# Patient Record
Sex: FEMALE | Race: BLACK OR AFRICAN AMERICAN | URBAN - METROPOLITAN AREA
[De-identification: names, ages, dates, MRNs, and addresses within clinical notes are randomized per-mention and may not be internally consistent; named-entity substitution may affect disease eponyms.]

---

## 2018-06-14 ENCOUNTER — OFFICE VISIT (OUTPATIENT)
Dept: INTERNAL MEDICINE CLINIC | Age: 38
End: 2018-06-14

## 2018-06-14 VITALS
BODY MASS INDEX: 37.05 KG/M2 | OXYGEN SATURATION: 97 % | HEIGHT: 64 IN | TEMPERATURE: 99.8 F | DIASTOLIC BLOOD PRESSURE: 81 MMHG | HEART RATE: 89 BPM | WEIGHT: 217 LBS | SYSTOLIC BLOOD PRESSURE: 120 MMHG | RESPIRATION RATE: 18 BRPM

## 2018-06-14 DIAGNOSIS — M21.372 LEFT FOOT DROP: ICD-10-CM

## 2018-06-14 DIAGNOSIS — E11.8 TYPE 2 DIABETES MELLITUS WITH COMPLICATION, WITHOUT LONG-TERM CURRENT USE OF INSULIN (HCC): Primary | ICD-10-CM

## 2018-06-14 DIAGNOSIS — R20.0 NUMBNESS: ICD-10-CM

## 2018-06-14 LAB — HBA1C MFR BLD HPLC: 12.3 %

## 2018-06-14 RX ORDER — METFORMIN HYDROCHLORIDE 500 MG/1
500 TABLET, EXTENDED RELEASE ORAL
Qty: 30 TAB | Refills: 2 | Status: SHIPPED | OUTPATIENT
Start: 2018-06-14 | End: 2018-06-14 | Stop reason: SDUPTHER

## 2018-06-14 RX ORDER — METFORMIN HYDROCHLORIDE 500 MG/1
500 TABLET, EXTENDED RELEASE ORAL
Qty: 30 TAB | Refills: 2 | Status: SHIPPED | OUTPATIENT
Start: 2018-06-14 | End: 2019-01-16 | Stop reason: ALTCHOICE

## 2018-06-14 NOTE — PROGRESS NOTES
Chief Complaint   Patient presents with    Numbness     C/O numbness to top of left foot for 2 weeks. 1. Have you been to the ER, urgent care clinic since your last visit? Hospitalized since your last visit? No    2. Have you seen or consulted any other health care providers outside of the [facility] since your last visit? Include any pap smears or colon screening.  Yes When: GYN  PHQ over the last two weeks 6/14/2018   Little interest or pleasure in doing things Not at all   Feeling down, depressed or hopeless Not at all   Total Score PHQ 2 0

## 2018-06-18 DIAGNOSIS — M21.372 LEFT FOOT DROP: ICD-10-CM

## 2018-06-18 DIAGNOSIS — R20.0 NUMBNESS: ICD-10-CM

## 2018-06-20 NOTE — PATIENT INSTRUCTIONS
Learning About Foot Drop  What is foot drop? Foot drop is a problem that makes you unable to lift the front of your foot normally when you walk. It's called foot drop because the front of the foot drops instead of pointing up when you walk. You can have foot drop in one or both feet. What causes it? Normally, your brain, nerves, and muscles work together to help you walk. Foot drop may occur if you have a problem with your:  · Brain. A stroke or other problem can damage the area of your brain that helps you move your legs and feet. This can cause foot drop. · Nerves. Pressure on or damage to the nerves that help you move your leg and foot can lead to foot drop. This can happen near the knee from a cast or from surgery. Or it can happen to the nerves in the lower back from a problem like a herniated disc. · Muscles. Some kinds of muscular dystrophy or other muscle problems can cause foot drop. What are the symptoms? When you walk, you may need to bring your hip higher than normal to keep your foot from hitting the ground. When your foot does touch the ground, it may flop down toe first instead of heel first.  You may notice that you often trip. This happens because the front of your foot rubs on the ground, especially on a surface that is uneven like a ledge or carpet. How is foot drop diagnosed? Your doctor will do a physical exam. This will include watching how you walk, checking your reflexes to see how your nerves are working, and checking for weakness in your muscles. Your doctor may order tests, including:  · Nerve tests. These tests show how well and how fast the nerves send electrical signals to your muscles. · Imaging tests, such as an MRI or CT scan. These tests can show pressure on a nerve in your back or can help find problems in your brain. How is foot drop treated? The treatment depends on what is causing the foot drop.  If it's from pressure on a nerve near the knee or in the back, removing that pressure may make your foot drop better. This could involve removing a cast or even having surgery to repair a damaged disc or nerve. Nerves can take weeks or months to heal. If you avoid crossing your legs or your ankles, you can help reduce pressure on the nerves that can cause foot drop. Your doctor may prescribe a lightweight leg brace and a shoe insert. They can help keep your foot from dropping when you walk. They can help if you have a short-term problem such as pressure on a nerve or a long-term problem like a stroke. Physical therapy and exercises also can help you walk safely. Follow-up care is a key part of your treatment and safety. Be sure to make and go to all appointments, and call your doctor if you are having problems. It's also a good idea to know your test results and keep a list of the medicines you take. Where can you learn more? Go to http://isaiah-juan jose.info/. Enter [phone] in the search box to learn more about \"Learning About Foot Drop. \"  Current as of: October 14, 2016  Content Version: 11.4  © 5706-5896 Healthwise, Incorporated. Care instructions adapted under license by [employer] (which disclaims liability or warranty for this information). If you have questions about a medical condition or this instruction, always ask your healthcare professional. [address] any warranty or liability for your use of this information.

## 2019-01-16 ENCOUNTER — OFFICE VISIT (OUTPATIENT)
Dept: FAMILY MEDICINE CLINIC | Age: 39
End: 2019-01-16

## 2019-01-16 ENCOUNTER — HOSPITAL ENCOUNTER (OUTPATIENT)
Dept: LAB | Age: 39
Discharge: HOME | End: 2019-01-16
Payer: COMMERCIAL

## 2019-01-16 VITALS
WEIGHT: 228 LBS | DIASTOLIC BLOOD PRESSURE: 90 MMHG | SYSTOLIC BLOOD PRESSURE: 141 MMHG | TEMPERATURE: 97.5 F | HEIGHT: 64 IN | OXYGEN SATURATION: 97 % | RESPIRATION RATE: 18 BRPM | HEART RATE: 87 BPM | BODY MASS INDEX: 38.93 KG/M2

## 2019-01-16 DIAGNOSIS — N89.8 VAGINAL DISCHARGE: ICD-10-CM

## 2019-01-16 DIAGNOSIS — Z00.00 ROUTINE GENERAL MEDICAL EXAMINATION AT A HEALTH CARE FACILITY: ICD-10-CM

## 2019-01-16 DIAGNOSIS — E11.8 TYPE 2 DIABETES MELLITUS WITH COMPLICATION, WITHOUT LONG-TERM CURRENT USE OF INSULIN (HCC): Primary | ICD-10-CM

## 2019-01-16 PROBLEM — E66.01 SEVERE OBESITY (HCC): Status: ACTIVE | Noted: 2019-01-16

## 2019-01-16 PROBLEM — N80.30 ENDOMETRIOSIS OF PELVIC PERITONEUM: Status: ACTIVE | Noted: 2019-01-16

## 2019-01-16 PROBLEM — E11.21 TYPE 2 DIABETES WITH NEPHROPATHY (HCC): Status: ACTIVE | Noted: 2019-01-16

## 2019-01-16 PROBLEM — N94.6 DYSMENORRHEA: Status: ACTIVE | Noted: 2019-01-16

## 2019-01-16 LAB
BILIRUB UR QL STRIP: NEGATIVE
GLUCOSE UR-MCNC: NORMAL MG/DL
HBA1C MFR BLD HPLC: 12.1 %
KETONES P FAST UR STRIP-MCNC: NORMAL MG/DL
MICROALBUMIN UR TEST STR-MCNC: 30 MG/L
MICROALBUMIN/CREAT RATIO POC: NORMAL MG/G
PH UR STRIP: 5.5 [PH] (ref 4.6–8)
PROT UR QL STRIP: NEGATIVE
SP GR UR STRIP: 1.01 (ref 1–1.03)
UA UROBILINOGEN AMB POC: NORMAL (ref 0.2–1)
URINALYSIS CLARITY POC: CLEAR
URINALYSIS COLOR POC: YELLOW
URINE BLOOD POC: NORMAL
URINE LEUKOCYTES POC: NEGATIVE
URINE NITRITES POC: NEGATIVE

## 2019-01-16 PROCEDURE — 87798 DETECT AGENT NOS DNA AMP: CPT

## 2019-01-16 RX ORDER — METFORMIN HYDROCHLORIDE 500 MG/1
500 TABLET ORAL 2 TIMES DAILY WITH MEALS
Qty: 360 TAB | Refills: 0 | Status: SHIPPED | OUTPATIENT
Start: 2019-01-16 | End: 2020-09-22 | Stop reason: SDUPTHER

## 2019-01-16 RX ORDER — FLUCONAZOLE 150 MG/1
150 TABLET ORAL DAILY
Qty: 1 TAB | Refills: 1 | Status: SHIPPED | OUTPATIENT
Start: 2019-01-16 | End: 2019-01-17

## 2019-01-16 RX ORDER — METFORMIN HYDROCHLORIDE 500 MG/1
500 TABLET, EXTENDED RELEASE ORAL
Qty: 30 TAB | Refills: 2 | Status: CANCELLED | OUTPATIENT
Start: 2019-01-16

## 2019-01-16 NOTE — PATIENT INSTRUCTIONS
Counting Carbohydrates: Care Instructions  Your Care Instructions    You don't have to eat special foods when you have diabetes. You just have to be careful to eat healthy foods. Carbohydrates (carbs) raise blood sugar higher and quicker than any other nutrient. Carbs are found in desserts, breads and cereals, and fruit. They're also in starchy vegetables. These include potatoes, corn, and grains such as rice and pasta. Carbs are also in milk and yogurt. The more carbs you eat at one time, the higher your blood sugar will rise. Spreading carbs all through the day helps keep your blood sugar levels within your target range. Counting carbs is one of the best ways to keep your blood sugar under control. If you use insulin, counting carbs helps you match the right amount of insulin to the number of grams of carbs in a meal. Then you can change your diet and insulin dose as needed. Testing your blood sugar several times a day can help you learn how carbs affect your blood sugar. A registered dietitian or certified diabetes educator can help you plan meals and snacks. Follow-up care is a key part of your treatment and safety. Be sure to make and go to all appointments, and call your doctor if you are having problems. It's also a good idea to know your test results and keep a list of the medicines you take. How can you care for yourself at home? Know your daily amount of carbohydrates  Your daily amount depends on several things, such as your weight, how active you are, which diabetes medicines you take, and what your goals are for your blood sugar levels. A registered dietitian or certified diabetes educator can help you plan how many carbs to include in each meal and snack. For most adults, a guideline for the daily amount of carbs is:  · 45 to 60 grams at each meal. That's about the same as 3 to 4 carbohydrate servings. · 15 to 20 grams at each snack. That's about the same as 1 carbohydrate serving.   Count carbs  Counting carbs lets you know how much rapid-acting insulin to take before you eat. If you use an insulin pump, you get a constant rate of insulin during the day. So the pump must be programmed at meals. This gives you extra insulin to cover the rise in blood sugar after meals. If you take insulin:  · Learn your own insulin-to-carb ratio. You and your diabetes health professional will figure out the ratio. You can do this by testing your blood sugar after meals. For example, you may need a certain amount of insulin for every 15 grams of carbs. · Add up the carb grams in a meal. Then you can figure out how many units of insulin to take based on your insulin-to-carb ratio. · Exercise lowers blood sugar. You can use less insulin than you would if you were not doing exercise. Keep in mind that timing matters. If you exercise within 1 hour after a meal, your body may need less insulin for that meal than it would if you exercised 3 hours after the meal. Test your blood sugar to find out how exercise affects your need for insulin. If you do or don't take insulin:  · Look at labels on packaged foods. This can tell you how many carbs are in a serving. You can also use guides from the American Diabetes Association. · Be aware of portions, or serving sizes. If a package has two servings and you eat the whole package, you need to double the number of grams of carbohydrate listed for one serving. · Protein, fat, and fiber do not raise blood sugar as much as carbs do. If you eat a lot of these nutrients in a meal, your blood sugar will rise more slowly than it would otherwise. Eat from all food groups  · Eat at least three meals a day. · Plan meals to include food from all the food groups. The food groups include grains, fruits, dairy, proteins, and vegetables. · Talk to your dietitian or diabetes educator about ways to add limited amounts of sweets into your meal plan. · If you drink alcohol, talk to your doctor. It may not be recommended when you are taking certain diabetes medicines. Where can you learn more? Go to http://isaiah-juan jose.info/. Enter A341 in the search box to learn more about \"Counting Carbohydrates: Care Instructions. \"  Current as of: December 7, 2017  Content Version: 11.8  © 3627-8630 [employer]. Care instructions adapted under license by [employer] (which disclaims liability or warranty for this information). If you have questions about a medical condition or this instruction, always ask your healthcare professional. [address] any warranty or liability for your use of this information.

## 2019-01-16 NOTE — PROGRESS NOTES
Rick Ramirez is a 43 y.o. female (: 1980) presenting to address:vaginal discharge x1 month    Chief Complaint   Patient presents with    Vaginal Discharge     x1 month       Vitals:    19 1016   BP: 141/90   Pulse: 87   Resp: 18   Temp: 97.5 °F (36.4 °C)   TempSrc: Oral   SpO2: 97%   Weight: 228 lb (103.4 kg)   Height: 5' 4\" (1.626 m)   PainSc:   1   PainLoc: Vagina   LMP: 2018       Hearing/Vision:   No exam data present    Learning Assessment:   No flowsheet data found. Depression Screening:     PHQ over the last two weeks 2019   Little interest or pleasure in doing things Not at all   Feeling down, depressed, irritable, or hopeless Several days   Total Score PHQ 2 1     Fall Risk Assessment:   No flowsheet data found. Abuse Screening:   No flowsheet data found. Coordination of Care Questionaire:   1. Have you been to the ER, urgent care clinic since your last visit? Hospitalized since your last visit? no    2. Have you seen or consulted any other health care providers outside of the [address] [place] [address] since your last visit? Include any pap smears or colon screening. no    Advanced Directive:   1. Do you have an Advanced Directive? no    2. Would you like information on Advanced Directives?  No    Patient declined flu vaccine

## 2019-01-16 NOTE — PROGRESS NOTES
Subjective:     Viviana Morton is a 43 y.o. female seen for follow up of diabetes. She also has obesity. Diabetic Review of Systems - medication compliance: noncompliant much of the time, diabetic diet compliance: noncompliant much of the time, home glucose monitoring: is performed sporadically. Other symptoms and concerns: insurance issue resolved now would like to restart meds and have labs. Also c/o vaginal discharge often gets yeast but would like testing. Current Outpatient Medications   Medication Sig Dispense Refill    metFORMIN (GLUCOPHAGE) 500 mg tablet Take 1 Tab by mouth two (2) times daily (with meals). 360 Tab 0    fluconazole (DIFLUCAN) 150 mg tablet Take 1 Tab by mouth daily for 1 day. FDA advises cautious prescribing of oral fluconazole in pregnancy. 1 Tab 1    semaglutide (OZEMPIC) 0.25 mg/0.2 mL (2 mg/1.5 mL) sub-q pen 0.5 mg by SubCUTAneous route every seven (7) days. 2 Box 1     No Known Allergies     Lab Results   Component Value Date/Time    Hemoglobin A1c (POC) 12.1 01/16/2019 10:27 AM         Review of Systems  Pertinent items are noted in HPI. Objective:     Visit Vitals  /90 (BP 1 Location: Right arm, BP Patient Position: Sitting)   Pulse 87   Temp 97.5 °F (36.4 °C) (Oral)   Resp 18   Ht 5' 4\" (1.626 m)   Wt 228 lb (103.4 kg)   LMP 11/16/2018 Comment: PCOS-has GYN   SpO2 97%   BMI 39.14 kg/m²     Appearance: alert, well appearing, and in no distress. Exam: heart sounds normal rate, regular rhythm, normal S1, S2, no murmurs, rubs, clicks or gallops, chest clear, no hepatosplenomegaly, feet: warm, good capillary refill  Lab review: no lab studies available for review at time of visit. Assessment/Plan:     diabetes poorly controlled.   Diabetic issues reviewed with her: diabetic diet discussed in detail, written exchange diet given, low cholesterol diet, weight control and daily exercise discussed, home glucose monitoring emphasized and all medications, side effects and compliance discussed carefully. Nuswab pending for yeast but gave diflucan. Will call with results. rtc 3 months recheck a1c. Encounter Diagnoses   Name Primary?     Type 2 diabetes mellitus with complication, without long-term current use of insulin (HCC) Yes    Vaginal discharge     Routine general medical examination at a health care facility      Orders Placed This Encounter    NUSWAB VAGINITIS PLUS    CBC W/O DIFF    METABOLIC PANEL, BASIC    HEPATIC FUNCTION PANEL    LIPID PANEL    TSH 3RD GENERATION    T4, FREE    VITAMIN D, 25 HYDROXY    AMB POC HEMOGLOBIN A1C    AMB POC URINE, MICROALBUMIN, SEMIQUANTITATIVE    AMB POC URINALYSIS DIP STICK AUTO W/O MICRO    metFORMIN (GLUCOPHAGE) 500 mg tablet    fluconazole (DIFLUCAN) 150 mg tablet

## 2019-01-18 LAB
A VAGINAE DNA VAG QL NAA+PROBE: NORMAL SCORE
BVAB2 DNA VAG QL NAA+PROBE: NORMAL SCORE
C ALBICANS DNA VAG QL NAA+PROBE: NEGATIVE
C GLABRATA DNA VAG QL NAA+PROBE: NEGATIVE
C TRACH RRNA SPEC QL NAA+PROBE: NEGATIVE
MEGA1 DNA VAG QL NAA+PROBE: NORMAL SCORE
N GONORRHOEA RRNA SPEC QL NAA+PROBE: NEGATIVE
SPECIMEN SOURCE: NORMAL
T VAGINALIS RRNA SPEC QL NAA+PROBE: NEGATIVE

## 2019-01-23 ENCOUNTER — HOSPITAL ENCOUNTER (OUTPATIENT)
Dept: LAB | Age: 39
Discharge: HOME | End: 2019-01-23
Payer: COMMERCIAL

## 2019-01-23 DIAGNOSIS — Z00.00 ROUTINE GENERAL MEDICAL EXAMINATION AT A HEALTH CARE FACILITY: ICD-10-CM

## 2019-01-23 DIAGNOSIS — E11.8 TYPE 2 DIABETES MELLITUS WITH COMPLICATION, WITHOUT LONG-TERM CURRENT USE OF INSULIN (HCC): ICD-10-CM

## 2019-01-23 LAB
25(OH)D3 SERPL-MCNC: 11 NG/ML (ref 30–100)
ALBUMIN SERPL-MCNC: 3.9 G/DL (ref 3.4–5)
ALBUMIN/GLOB SERPL: 1.1 {RATIO} (ref 0.8–1.7)
ALP SERPL-CCNC: 130 U/L (ref 45–117)
ALT SERPL-CCNC: 40 U/L (ref 13–56)
ANION GAP SERPL CALC-SCNC: 7 MMOL/L (ref 3–18)
AST SERPL-CCNC: 19 U/L (ref 15–37)
BILIRUB DIRECT SERPL-MCNC: 0.1 MG/DL (ref 0–0.2)
BILIRUB SERPL-MCNC: 0.5 MG/DL (ref 0.2–1)
BUN SERPL-MCNC: 10 MG/DL (ref 7–18)
BUN/CREAT SERPL: 14 (ref 12–20)
CALCIUM SERPL-MCNC: 9 MG/DL (ref 8.5–10.1)
CHLORIDE SERPL-SCNC: 101 MMOL/L (ref 100–108)
CHOLEST SERPL-MCNC: 193 MG/DL
CO2 SERPL-SCNC: 27 MMOL/L (ref 21–32)
CREAT SERPL-MCNC: 0.69 MG/DL (ref 0.6–1.3)
ERYTHROCYTE [DISTWIDTH] IN BLOOD BY AUTOMATED COUNT: 12.4 % (ref 11.6–14.5)
GLOBULIN SER CALC-MCNC: 3.7 G/DL (ref 2–4)
GLUCOSE SERPL-MCNC: 269 MG/DL (ref 74–99)
HCT VFR BLD AUTO: 42.6 % (ref 35–45)
HDLC SERPL-MCNC: 39 MG/DL (ref 40–60)
HDLC SERPL: 4.9 {RATIO} (ref 0–5)
HGB BLD-MCNC: 14.1 G/DL (ref 12–16)
LDLC SERPL CALC-MCNC: 97.2 MG/DL (ref 0–100)
LIPID PROFILE,FLP: ABNORMAL
MCH RBC QN AUTO: 29.3 PG (ref 24–34)
MCHC RBC AUTO-ENTMCNC: 33.1 G/DL (ref 31–37)
MCV RBC AUTO: 88.4 FL (ref 74–97)
PLATELET # BLD AUTO: 234 K/UL (ref 135–420)
PMV BLD AUTO: 13.7 FL (ref 9.2–11.8)
POTASSIUM SERPL-SCNC: 4.1 MMOL/L (ref 3.5–5.5)
PROT SERPL-MCNC: 7.6 G/DL (ref 6.4–8.2)
RBC # BLD AUTO: 4.82 M/UL (ref 4.2–5.3)
SODIUM SERPL-SCNC: 135 MMOL/L (ref 136–145)
T4 FREE SERPL-MCNC: 1.2 NG/DL (ref 0.7–1.5)
TRIGL SERPL-MCNC: 284 MG/DL (ref ?–150)
TSH SERPL DL<=0.05 MIU/L-ACNC: 1.1 UIU/ML (ref 0.36–3.74)
VLDLC SERPL CALC-MCNC: 56.8 MG/DL
WBC # BLD AUTO: 7.7 K/UL (ref 4.6–13.2)

## 2019-01-23 PROCEDURE — 80076 HEPATIC FUNCTION PANEL: CPT

## 2019-01-23 PROCEDURE — 36415 COLL VENOUS BLD VENIPUNCTURE: CPT

## 2019-01-23 PROCEDURE — 82306 VITAMIN D 25 HYDROXY: CPT

## 2019-01-23 PROCEDURE — 80061 LIPID PANEL: CPT

## 2019-01-23 PROCEDURE — 80048 BASIC METABOLIC PNL TOTAL CA: CPT

## 2019-01-23 PROCEDURE — 84443 ASSAY THYROID STIM HORMONE: CPT

## 2019-01-23 PROCEDURE — 84439 ASSAY OF FREE THYROXINE: CPT

## 2019-01-23 PROCEDURE — 85027 COMPLETE CBC AUTOMATED: CPT

## 2019-01-24 RX ORDER — ERGOCALCIFEROL 1.25 MG/1
50000 CAPSULE ORAL
Qty: 12 CAP | Refills: 1 | Status: SHIPPED | OUTPATIENT
Start: 2019-01-24 | End: 2021-09-14

## 2019-02-19 ENCOUNTER — OFFICE VISIT (OUTPATIENT)
Dept: FAMILY MEDICINE CLINIC | Age: 39
End: 2019-02-19

## 2019-02-19 VITALS
HEIGHT: 64 IN | RESPIRATION RATE: 18 BRPM | HEART RATE: 91 BPM | BODY MASS INDEX: 38.41 KG/M2 | OXYGEN SATURATION: 98 % | TEMPERATURE: 99.7 F | SYSTOLIC BLOOD PRESSURE: 140 MMHG | DIASTOLIC BLOOD PRESSURE: 98 MMHG | WEIGHT: 225 LBS

## 2019-02-19 DIAGNOSIS — J01.00 ACUTE NON-RECURRENT MAXILLARY SINUSITIS: Primary | ICD-10-CM

## 2019-02-19 RX ORDER — AMOXICILLIN AND CLAVULANATE POTASSIUM 500; 125 MG/1; MG/1
1 TABLET, FILM COATED ORAL 2 TIMES DAILY
Qty: 20 TAB | Refills: 0 | Status: SHIPPED | OUTPATIENT
Start: 2019-02-19 | End: 2019-03-01

## 2019-02-19 NOTE — PROGRESS NOTES
Assessment/Plan:    *Diagnoses and all orders for this visit:    1. Acute non-recurrent maxillary sinusitis  -     amoxicillin-clavulanate (AUGMENTIN) 500-125 mg per tablet; Take 1 Tab by mouth two (2) times a day for 10 days. If she gets a yeast infection from Abx, will call us. The plan was discussed with the patient. The patient verbalized understanding and is in agreement with the plan. All medication potential side effects were discussed with the patient.    -------------------------------------------------------------------------------------------------------------------        Ana Cristina Marquez is a 43 y.o. female and presents with Nasal Congestion (started 1 week ago denies fevers ); Sinus Pain; Sore Throat; Nasal Discharge; Chills; and Cough         Subjective: For the last 1 week, has had cold symptoms. Was feeling low in energy, was sneezing, + nasal drainage, progressed into sinus pressure and pain, + teeth hurt, + cough, dry, has bitter taste in mouth. ROS:  Review of Systems - Negative except as above    As above    The problem list was updated as a part of today's visit. Patient Active Problem List   Diagnosis Code    ADHD (attention deficit hyperactivity disorder) F90.9    Anxiety state, unspecified F41.1    PCOS (polycystic ovarian syndrome) E28.2    Type 2 diabetes mellitus with complication, without long-term current use of insulin ([facility]) E11.8    Type 2 diabetes with nephropathy ([facility]) E11.21    Severe obesity ([facility]) E66.01    Dysmenorrhea N94.6    Endometriosis of pelvic peritoneum N80.3       The PSH, FH were reviewed.         SH:  Social History     Tobacco Use    Smoking status: Former Smoker     Packs/day: 1.00     Years: 20.00     Pack years: 20.00     Types: Cigarettes    Smokeless tobacco: Never Used   Substance Use Topics    Alcohol use: Yes     Comment: socially    Drug use: No       Medications/Allergies:  Current Outpatient Medications on File Prior to Visit   Medication Sig Dispense Refill    Blood-Glucose Meter monitoring kit 1 touch verio IQ 1 Kit 0    ergocalciferol (ERGOCALCIFEROL) 50,000 unit capsule Take 1 Cap by mouth every seven (7) days. 12 Cap 1    metFORMIN (GLUCOPHAGE) 500 mg tablet Take 1 Tab by mouth two (2) times daily (with meals). 360 Tab 0    semaglutide (OZEMPIC) 0.25 mg/0.2 mL (2 mg/1.5 mL) sub-q pen 0.5 mg by SubCUTAneous route every seven (7) days. 2 Box 1     No current facility-administered medications on file prior to visit. No Known Allergies      Health Maintenance:   Health Maintenance   Topic Date Due    EYE EXAM RETINAL OR DILATED  07/16/2019 (Originally 11/23/1989)    Pneumococcal 19-64 Medium Risk (1 of 1 - PPSV23) 01/16/2020 (Originally 11/23/1998)    Influenza Age 5 to Adult  01/16/2020 (Originally 8/1/2018)    DTaP/Tdap/Td series (2 - Td) 04/09/2019    HEMOGLOBIN A1C Q6M  07/16/2019    FOOT EXAM Q1  01/16/2020    MICROALBUMIN Q1  01/16/2020    LIPID PANEL Q1  01/23/2020    PAP AKA CERVICAL CYTOLOGY  05/01/2021       Objective:  Visit Vitals  BP (!) 140/98 (BP 1 Location: Left arm, BP Patient Position: Sitting)   Pulse 91   Temp 99.7 °F (37.6 °C) (Oral)   Resp 18   Ht 5' 4\" (1.626 m)   Wt 225 lb (102.1 kg)   LMP 01/22/2019   SpO2 98%   BMI 38.62 kg/m²          Nurses notes and VS reviewed.         Physical Examination: General appearance - ill-appearing  Ears - bilateral TM's and external ear canals normal  Nose - mucosal congestion  Mouth - erythematous  Neck - supple, no significant adenopathy  Chest - clear to auscultation, no wheezes, rales or rhonchi, symmetric air entry  Heart - normal rate, regular rhythm, normal S1, S2, no murmurs, rubs, clicks or gallops      Labwork and Ancillary Studies:    CBC w/Diff  Lab Results   Component Value Date/Time    WBC 7.7 01/23/2019 11:46 AM    HGB 14.1 01/23/2019 11:46 AM    PLATELET 633 02/64/9659 11:46 AM         Basic Metabolic Profile  Lab Results   Component Value Date/Time    Sodium 135 (L) 01/23/2019 11:46 AM    Potassium 4.1 01/23/2019 11:46 AM    Chloride 101 01/23/2019 11:46 AM    CO2 27 01/23/2019 11:46 AM    Anion gap 7 01/23/2019 11:46 AM    Glucose 269 (H) 01/23/2019 11:46 AM    BUN 10 01/23/2019 11:46 AM    Creatinine 0.69 01/23/2019 11:46 AM    BUN/Creatinine ratio 14 01/23/2019 11:46 AM    GFR est AA >60 01/23/2019 11:46 AM    GFR est non-AA >60 01/23/2019 11:46 AM    Calcium 9.0 01/23/2019 11:46 AM         LFT  Lab Results   Component Value Date/Time    ALT (SGPT) 40 01/23/2019 11:46 AM    AST (SGOT) 19 01/23/2019 11:46 AM    Alk.  phosphatase 130 (H) 01/23/2019 11:46 AM    Bilirubin, direct 0.1 01/23/2019 11:46 AM    Bilirubin, total 0.5 01/23/2019 11:46 AM         Cholesterol  Lab Results   Component Value Date/Time    Cholesterol, total 193 01/23/2019 11:46 AM    HDL Cholesterol 39 (L) 01/23/2019 11:46 AM    LDL, calculated 97.2 01/23/2019 11:46 AM    Triglyceride 284 (H) 01/23/2019 11:46 AM    CHOL/HDL Ratio 4.9 01/23/2019 11:46 AM

## 2019-02-19 NOTE — PATIENT INSTRUCTIONS
Sinusitis: Care Instructions  Your Care Instructions    Sinusitis is an infection of the lining of the sinus cavities in your head. Sinusitis often follows a cold. It causes pain and pressure in your head and face. In most cases, sinusitis gets better on its own in 1 to 2 weeks. But some mild symptoms may last for several weeks. Sometimes antibiotics are needed. Follow-up care is a key part of your treatment and safety. Be sure to make and go to all appointments, and call your doctor if you are having problems. It's also a good idea to know your test results and keep a list of the medicines you take. How can you care for yourself at home? · Take an over-the-counter pain medicine, such as acetaminophen (Tylenol), ibuprofen (Advil, Motrin), or naproxen (Aleve). Read and follow all instructions on the label. · If the doctor prescribed antibiotics, take them as directed. Do not stop taking them just because you feel better. You need to take the full course of antibiotics. · Be careful when taking over-the-counter cold or flu medicines and Tylenol at the same time. Many of these medicines have acetaminophen, which is Tylenol. Read the labels to make sure that you are not taking more than the recommended dose. Too much acetaminophen (Tylenol) can be harmful. · Breathe warm, moist air from a steamy shower, a hot bath, or a sink filled with hot water. Avoid cold, dry air. Using a humidifier in your home may help. Follow the directions for cleaning the machine. · Use saline (saltwater) nasal washes to help keep your nasal passages open and wash out mucus and bacteria. You can buy saline nose drops at a grocery store or drugstore. Or you can make your own at home by adding 1 teaspoon of salt and 1 teaspoon of baking soda to 2 cups of distilled water. If you make your own, fill a bulb syringe with the solution, insert the tip into your nostril, and squeeze gently. [place] your nose.   · Put a hot, wet towel or a warm gel pack on your face 3 or 4 times a day for 5 to 10 minutes each time. · Try a decongestant nasal spray like oxymetazoline (Afrin). Do not use it for more than 3 days in a row. Using it for more than 3 days can make your congestion worse. When should you call for help? Call your doctor now or seek immediate medical care if:    · You have new or worse swelling or redness in your face or around your eyes.     · You have a new or higher fever.    Watch closely for changes in your health, and be sure to contact your doctor if:    · You have new or worse facial pain.     · The mucus from your nose becomes thicker (like pus) or has new blood in it.     · You are not getting better as expected. Where can you learn more? Go to http://isaiah-juan jose.info/. Enter P793 in the search box to learn more about \"Sinusitis: Care Instructions. \"  Current as of: March 27, 2018  Content Version: 11.9  © 7185-8923 [employer]. Care instructions adapted under license by [employer] (which disclaims liability or warranty for this information). If you have questions about a medical condition or this instruction, always ask your healthcare professional. [place] [address] any warranty or liability for your use of this information.

## 2020-04-06 ENCOUNTER — E-VISIT (OUTPATIENT)
Dept: FAMILY MEDICINE CLINIC | Age: 40
End: 2020-04-06

## 2020-04-06 DIAGNOSIS — N76.1 SUBACUTE VAGINITIS: Primary | ICD-10-CM

## 2020-04-06 RX ORDER — FLUCONAZOLE 150 MG/1
150 TABLET ORAL DAILY
Qty: 1 TAB | Refills: 1 | Status: SHIPPED | OUTPATIENT
Start: 2020-04-06 | End: 2020-04-07

## 2020-04-06 NOTE — TELEPHONE ENCOUNTER
1. Subacute vaginitis  - fluconazole (DIFLUCAN) 150 mg tablet; Take 1 Tab by mouth daily for 1 day. FDA advises cautious prescribing of oral fluconazole in pregnancy. Dispense: 1 Tab; Refill: 1    HPI provided in e-visit documentation by patient. 5-10 minutes were spent on the digital evaluation and management of this patient.      Pawan Venegas MD  Internal Medicine, Family Medicine & Sports Medicine

## 2020-06-11 RX ORDER — METFORMIN HYDROCHLORIDE 500 MG/1
TABLET ORAL
Qty: 360 TAB | Refills: 0 | OUTPATIENT
Start: 2020-06-11

## 2020-06-11 NOTE — TELEPHONE ENCOUNTER
Last seen 2/19/19     Last filled 1/16/19 qty 360 w/ 0 refill    Rx denied needs to establish care w/ new PCP, left message informing to call office to establish care. No future appointments.

## 2020-06-16 ENCOUNTER — TELEPHONE (OUTPATIENT)
Dept: FAMILY MEDICINE CLINIC | Age: 40
End: 2020-06-16

## 2020-06-16 DIAGNOSIS — E55.9 VITAMIN D DEFICIENCY: ICD-10-CM

## 2020-06-16 DIAGNOSIS — E11.21 TYPE 2 DIABETES WITH NEPHROPATHY (HCC): Primary | ICD-10-CM

## 2020-06-16 NOTE — TELEPHONE ENCOUNTER
Pt has long h/o noncompliance with diabetes f/u. Last seen in our office 1.5 years ago. IF I decide to accept her as a patient, she needs labs one week PRIOR to appointment. Additionally, she needs to keep a blood sugar log and have that log ready for the appointment.

## 2020-06-16 NOTE — TELEPHONE ENCOUNTER
Pt is trying to establish care moving forwarding since NP Jesus Rocha is no longer here and she interested in becoming your patient

## 2020-08-01 ENCOUNTER — E-VISIT (OUTPATIENT)
Dept: FAMILY MEDICINE CLINIC | Age: 40
End: 2020-08-01

## 2020-08-03 ENCOUNTER — VIRTUAL VISIT (OUTPATIENT)
Dept: FAMILY MEDICINE CLINIC | Age: 40
End: 2020-08-03

## 2020-08-03 DIAGNOSIS — E55.9 HYPOVITAMINOSIS D: ICD-10-CM

## 2020-08-03 DIAGNOSIS — E11.8 TYPE 2 DIABETES MELLITUS WITH COMPLICATION, WITHOUT LONG-TERM CURRENT USE OF INSULIN (HCC): ICD-10-CM

## 2020-08-03 DIAGNOSIS — B37.31 CANDIDA VAGINITIS: Primary | ICD-10-CM

## 2020-08-03 RX ORDER — FLUCONAZOLE 150 MG/1
150 TABLET ORAL DAILY
Qty: 2 TAB | Refills: 0 | Status: SHIPPED | OUTPATIENT
Start: 2020-08-03 | End: 2020-09-08 | Stop reason: SDUPTHER

## 2020-08-03 NOTE — PROGRESS NOTES
Javid Rhodes is a 35 y.o. female who was seen by synchronous (real-time) audio-video technology on 8/3/2020 for No chief complaint on file. Assessment & Plan:   Diagnoses and all orders for this visit:    1. Candida vaginitis  -     fluconazole (DIFLUCAN) 150 mg tablet; Take 1 Tab by mouth daily. FDA advises cautious prescribing of oral fluconazole in pregnancy. 2. Type 2 diabetes mellitus with complication, without long-term current use of insulin (HCC)  -     CBC WITH AUTOMATED DIFF; Future  -     HEPATIC FUNCTION PANEL; Future  -     LIPID PANEL; Future  -     METABOLIC PANEL, BASIC; Future  -     TSH 3RD GENERATION; Future  -     T4, FREE; Future  -     HEMOGLOBIN A1C W/O EAG; Future  -     MICROALBUMIN, UR, RAND W/ MICROALB/CREAT RATIO; Future    3. Hypovitaminosis D  -     VITAMIN D, 25 HYDROXY; Future      Will call for a physical (October) and do labs prior. 712  Subjective:       Pt was seen for an issue today. She is having vaginal itching and whitish-discharge which she knows is a yeast infection. She has had these before. No recent Abx or other exposures to explain having one now. She is diabetic and is not compliant with treatment so likely, her sugars are high. Prior to Admission medications    Medication Sig Start Date End Date Taking? Authorizing Provider   fluconazole (DIFLUCAN) 150 mg tablet Take 1 Tab by mouth daily. FDA advises cautious prescribing of oral fluconazole in pregnancy. 8/3/20  Yes Pawel Joe MD   Blood-Glucose Meter monitoring kit 1 touch verio IQ 1/24/19   Amelia Mac NP   ergocalciferol (ERGOCALCIFEROL) 50,000 unit capsule Take 1 Cap by mouth every seven (7) days. 1/24/19   Amelia Mac NP   metFORMIN (GLUCOPHAGE) 500 mg tablet Take 1 Tab by mouth two (2) times daily (with meals). 1/16/19   Amelia Mac NP   semaglutide (OZEMPIC) 0.25 mg/0.2 mL (2 mg/1.5 mL) sub-q pen 0.5 mg by SubCUTAneous route every seven (7) days.  6/14/18 Selina Soulier, PA-C     Patient Active Problem List   Diagnosis Code    ADHD (attention deficit hyperactivity disorder) F90.9    Anxiety state, unspecified F41.1    PCOS (polycystic ovarian syndrome) E28.2    Type 2 diabetes mellitus with complication, without long-term current use of insulin (HCC) E11.8    Type 2 diabetes with nephropathy ([facility]) E11.21    Severe obesity ([facility]) E66.01    Dysmenorrhea N94.6    Endometriosis of pelvic peritoneum N80.3     Current Outpatient Medications   Medication Sig Dispense Refill    fluconazole (DIFLUCAN) 150 mg tablet Take 1 Tab by mouth daily. FDA advises cautious prescribing of oral fluconazole in pregnancy. 2 Tab 0    Blood-Glucose Meter monitoring kit 1 touch verio IQ 1 Kit 0    ergocalciferol (ERGOCALCIFEROL) 50,000 unit capsule Take 1 Cap by mouth every seven (7) days. 12 Cap 1    metFORMIN (GLUCOPHAGE) 500 mg tablet Take 1 Tab by mouth two (2) times daily (with meals). 360 Tab 0    semaglutide (OZEMPIC) 0.25 mg/0.2 mL (2 mg/1.5 mL) sub-q pen 0.5 mg by SubCUTAneous route every seven (7) days. 2 Box 1     No Known Allergies  Past Medical History:   Diagnosis Date    ADHD (attention deficit hyperactivity disorder)     Anxiety state, unspecified     PCOS (polycystic ovarian syndrome)      Past Surgical History:   Procedure Laterality Date    HX  SECTION  2009     Family History   Problem Relation Age of Onset    Migraines Mother     Arthritis-osteo Mother     Diabetes Father     Arthritis-osteo Father      Social History     Tobacco Use    Smoking status: Former Smoker     Packs/day: 1.00     Years: 20.00     Pack years: 20.00     Types: Cigarettes    Smokeless tobacco: Never Used   Substance Use Topics    Alcohol use: Yes     Comment: socially       ROS    Objective:   No flowsheet data found.    General: alert, cooperative, no distress   Mental  status: normal mood, behavior, speech, dress, motor activity, and thought processes, able to follow commands   HENT: NCAT   Neck: no visualized mass   Resp: no respiratory distress   Neuro: no gross deficits   Skin: no discoloration or lesions of concern on visible areas   Psychiatric: normal affect, consistent with stated mood, no evidence of hallucinations     Additional exam findings: We discussed the expected course, resolution and complications of the diagnosis(es) in detail. Medication risks, benefits, costs, interactions, and alternatives were discussed as indicated. I advised her to contact the office if her condition worsens, changes or fails to improve as anticipated. She expressed understanding with the diagnosis(es) and plan. Sabrina Brock, who was evaluated through a patient-initiated, synchronous (real-time) audio-video encounter, and/or her healthcare decision maker, is aware that it is a billable service, with coverage as determined by her insurance carrier. She provided verbal consent to proceed: Yes, and patient identification was verified. It was conducted pursuant to the emergency declaration under the [address] [place] [address] authority and the [employer] Act. A caregiver was present when appropriate. Ability to conduct physical exam was limited. I was in the office. The patient was at home.       Luana Espino MD

## 2020-09-01 ENCOUNTER — HOSPITAL ENCOUNTER (OUTPATIENT)
Dept: LAB | Age: 40
Discharge: HOME | End: 2020-09-01
Payer: COMMERCIAL

## 2020-09-01 DIAGNOSIS — E11.8 TYPE 2 DIABETES MELLITUS WITH COMPLICATION, WITHOUT LONG-TERM CURRENT USE OF INSULIN (HCC): ICD-10-CM

## 2020-09-01 DIAGNOSIS — E55.9 HYPOVITAMINOSIS D: ICD-10-CM

## 2020-09-01 LAB
25(OH)D3 SERPL-MCNC: 13.5 NG/ML (ref 30–100)
ALBUMIN SERPL-MCNC: 4 G/DL (ref 3.4–5)
ALBUMIN/GLOB SERPL: 1.1 {RATIO} (ref 0.8–1.7)
ALP SERPL-CCNC: 125 U/L (ref 45–117)
ALT SERPL-CCNC: 23 U/L (ref 13–56)
ANION GAP SERPL CALC-SCNC: 10 MMOL/L (ref 3–18)
AST SERPL-CCNC: 15 U/L (ref 10–38)
BASOPHILS # BLD: 0 K/UL (ref 0–0.1)
BASOPHILS NFR BLD: 0 % (ref 0–2)
BILIRUB DIRECT SERPL-MCNC: 0.2 MG/DL (ref 0–0.2)
BILIRUB SERPL-MCNC: 0.6 MG/DL (ref 0.2–1)
BUN SERPL-MCNC: 10 MG/DL (ref 7–18)
BUN/CREAT SERPL: 15 (ref 12–20)
CALCIUM SERPL-MCNC: 8.9 MG/DL (ref 8.5–10.1)
CHLORIDE SERPL-SCNC: 102 MMOL/L (ref 100–111)
CHOLEST SERPL-MCNC: 219 MG/DL
CO2 SERPL-SCNC: 23 MMOL/L (ref 21–32)
CREAT SERPL-MCNC: 0.65 MG/DL (ref 0.6–1.3)
DIFFERENTIAL METHOD BLD: ABNORMAL
EOSINOPHIL # BLD: 0.1 K/UL (ref 0–0.4)
EOSINOPHIL NFR BLD: 1 % (ref 0–5)
ERYTHROCYTE [DISTWIDTH] IN BLOOD BY AUTOMATED COUNT: 13 % (ref 11.6–14.5)
GLOBULIN SER CALC-MCNC: 3.6 G/DL (ref 2–4)
GLUCOSE SERPL-MCNC: 326 MG/DL (ref 74–99)
HBA1C MFR BLD: 12 % (ref 4.2–5.6)
HCT VFR BLD AUTO: 43.6 % (ref 35–45)
HDLC SERPL-MCNC: 41 MG/DL (ref 40–60)
HDLC SERPL: 5.3 {RATIO} (ref 0–5)
HGB BLD-MCNC: 14.4 G/DL (ref 12–16)
LDLC SERPL CALC-MCNC: ABNORMAL MG/DL (ref 0–100)
LIPID PROFILE,FLP: ABNORMAL
LYMPHOCYTES # BLD: 2.5 K/UL (ref 0.9–3.6)
LYMPHOCYTES NFR BLD: 39 % (ref 21–52)
MCH RBC QN AUTO: 29.6 PG (ref 24–34)
MCHC RBC AUTO-ENTMCNC: 33 G/DL (ref 31–37)
MCV RBC AUTO: 89.5 FL (ref 74–97)
MONOCYTES # BLD: 0.4 K/UL (ref 0.05–1.2)
MONOCYTES NFR BLD: 5 % (ref 3–10)
NEUTS SEG # BLD: 3.6 K/UL (ref 1.8–8)
NEUTS SEG NFR BLD: 55 % (ref 40–73)
PLATELET # BLD AUTO: 224 K/UL (ref 135–420)
PMV BLD AUTO: 13.5 FL (ref 9.2–11.8)
POTASSIUM SERPL-SCNC: 3.9 MMOL/L (ref 3.5–5.5)
PROT SERPL-MCNC: 7.6 G/DL (ref 6.4–8.2)
RBC # BLD AUTO: 4.87 M/UL (ref 4.2–5.3)
SODIUM SERPL-SCNC: 135 MMOL/L (ref 136–145)
T4 FREE SERPL-MCNC: 1.2 NG/DL (ref 0.7–1.5)
TRIGL SERPL-MCNC: 660 MG/DL (ref ?–150)
TSH SERPL DL<=0.05 MIU/L-ACNC: 1.43 UIU/ML (ref 0.36–3.74)
VLDLC SERPL CALC-MCNC: ABNORMAL MG/DL
WBC # BLD AUTO: 6.6 K/UL (ref 4.6–13.2)

## 2020-09-01 PROCEDURE — 80061 LIPID PANEL: CPT

## 2020-09-01 PROCEDURE — 80048 BASIC METABOLIC PNL TOTAL CA: CPT

## 2020-09-01 PROCEDURE — 83036 HEMOGLOBIN GLYCOSYLATED A1C: CPT

## 2020-09-01 PROCEDURE — 84443 ASSAY THYROID STIM HORMONE: CPT

## 2020-09-01 PROCEDURE — 36415 COLL VENOUS BLD VENIPUNCTURE: CPT

## 2020-09-01 PROCEDURE — 85025 COMPLETE CBC W/AUTO DIFF WBC: CPT

## 2020-09-01 PROCEDURE — 80076 HEPATIC FUNCTION PANEL: CPT

## 2020-09-01 PROCEDURE — 84439 ASSAY OF FREE THYROXINE: CPT

## 2020-09-01 PROCEDURE — 82306 VITAMIN D 25 HYDROXY: CPT

## 2020-09-01 PROCEDURE — 82043 UR ALBUMIN QUANTITATIVE: CPT

## 2020-09-02 LAB
CREAT UR-MCNC: 98 MG/DL (ref 30–125)
MICROALBUMIN UR-MCNC: 6.01 MG/DL (ref 0–3)
MICROALBUMIN/CREAT UR-RTO: 61 MG/G (ref 0–30)

## 2020-09-04 NOTE — PROGRESS NOTES
Please call pt and set up a physical in office for Oct.  Her diabetes is terribly uncontrolled plus other concerns.

## 2020-09-22 ENCOUNTER — VIRTUAL VISIT (OUTPATIENT)
Dept: FAMILY MEDICINE CLINIC | Age: 40
End: 2020-09-22
Payer: COMMERCIAL

## 2020-09-22 DIAGNOSIS — E78.49 OTHER HYPERLIPIDEMIA: ICD-10-CM

## 2020-09-22 DIAGNOSIS — E55.9 HYPOVITAMINOSIS D: ICD-10-CM

## 2020-09-22 DIAGNOSIS — E11.21 TYPE 2 DIABETES WITH NEPHROPATHY (HCC): Primary | ICD-10-CM

## 2020-09-22 PROCEDURE — 99214 OFFICE O/P EST MOD 30 MIN: CPT | Performed by: INTERNAL MEDICINE

## 2020-09-22 RX ORDER — PEN NEEDLE, DIABETIC 30 GX3/16"
NEEDLE, DISPOSABLE MISCELLANEOUS
Qty: 100 PEN NEEDLE | Refills: 2 | Status: SHIPPED | OUTPATIENT
Start: 2020-09-22 | End: 2020-11-17 | Stop reason: SDUPTHER

## 2020-09-22 RX ORDER — METFORMIN HYDROCHLORIDE 500 MG/1
500 TABLET ORAL 2 TIMES DAILY WITH MEALS
Qty: 180 TAB | Refills: 0 | Status: SHIPPED | OUTPATIENT
Start: 2020-09-22 | End: 2020-11-17 | Stop reason: SDUPTHER

## 2020-09-22 RX ORDER — ATORVASTATIN CALCIUM 10 MG/1
10 TABLET, FILM COATED ORAL DAILY
Qty: 90 TAB | Refills: 0 | Status: SHIPPED | OUTPATIENT
Start: 2020-09-22 | End: 2020-11-17 | Stop reason: SDUPTHER

## 2020-09-22 NOTE — PROGRESS NOTES
Namita Nuno is a 35 y.o. female who was seen by synchronous (real-time) audio-video technology on 9/22/2020 for Diabetes        Assessment & Plan:   Diagnoses and all orders for this visit:    1. Type 2 diabetes with nephropathy (HCC)  -     metFORMIN (GLUCOPHAGE) 500 mg tablet; Take 1 Tab by mouth two (2) times daily (with meals). -     liraglutide (VICTOZA) 0.6 mg/0.1 mL (18 mg/3 mL) pnij; 0.6mg daily x 1 week then increase to 1.2mg daily.  -     HEMOGLOBIN A1C W/O EAG; Future  -     METABOLIC PANEL, BASIC; Future    2. Hypovitaminosis D  -     VITAMIN D, 25 HYDROXY; Future    3. Other hyperlipidemia  -     atorvastatin (LIPITOR) 10 mg tablet; Take 1 Tab by mouth daily.  -     LIPID PANEL; Future  -     HEPATIC FUNCTION PANEL; Future    Other orders  -     Insulin Needles, Disposable, 31 gauge x 5/16\" ndle; For once daily use. Pt has next appt in 2 months. BW prior. Will start OTC 2000 units Vit D. Will start Metformin again and add Victoza. 712  Subjective:       Pt seen for f/u today. I asked her to make this visit because her labs were terribly out of range. DM:  Current A1c is very high, at 12%. TGs are very high. Tch elevated. Vit D is low. Prior to Admission medications    Medication Sig Start Date End Date Taking? Authorizing Provider   metFORMIN (GLUCOPHAGE) 500 mg tablet Take 1 Tab by mouth two (2) times daily (with meals). 9/22/20  Yes Arpit Saini MD   liraglutide (VICTOZA) 0.6 mg/0.1 mL (18 mg/3 mL) pnij 0.6mg daily x 1 week then increase to 1.2mg daily. 9/22/20  Yes Arpit Saini MD   atorvastatin (LIPITOR) 10 mg tablet Take 1 Tab by mouth daily. 9/22/20  Yes Arpit Saini MD   Insulin Needles, Disposable, 31 gauge x 5/16\" ndle For once daily use. 9/22/20  Yes Arpit Saini MD   fluconazole (DIFLUCAN) 150 mg tablet Take 1 Tab by mouth daily. FDA advises cautious prescribing of oral fluconazole in pregnancy.  9/8/20 9/22/20  Arpit Saini MD Blood-Glucose Meter monitoring kit 1 touch verio IQ 19   Echo Salinas NP   ergocalciferol (ERGOCALCIFEROL) 50,000 unit capsule Take 1 Cap by mouth every seven (7) days. 19   Echo Salinas NP   metFORMIN (GLUCOPHAGE) 500 mg tablet Take 1 Tab by mouth two (2) times daily (with meals). 19  Echo Salinas NP   semaglutide (OZEMPIC) 0.25 mg/0.2 mL (2 mg/1.5 mL) sub-q pen 0.5 mg by SubCUTAneous route every seven (7) days. 18  Crystal Moy PA-C     Patient Active Problem List   Diagnosis Code    ADHD (attention deficit hyperactivity disorder) F90.9    Anxiety state, unspecified F41.1    PCOS (polycystic ovarian syndrome) E28.2    Type 2 diabetes mellitus with complication, without long-term current use of insulin ([facility]) E11.8    Type 2 diabetes with nephropathy ([facility]) E11.21    Severe obesity ([facility]) E66.01    Dysmenorrhea N94.6    Endometriosis of pelvic peritoneum N80.3     Current Outpatient Medications   Medication Sig Dispense Refill    metFORMIN (GLUCOPHAGE) 500 mg tablet Take 1 Tab by mouth two (2) times daily (with meals). 180 Tab 0    liraglutide (VICTOZA) 0.6 mg/0.1 mL (18 mg/3 mL) pnij 0.6mg daily x 1 week then increase to 1.2mg daily. 15 mL 1    atorvastatin (LIPITOR) 10 mg tablet Take 1 Tab by mouth daily. 90 Tab 0    Insulin Needles, Disposable, 31 gauge x 5/16\" ndle For once daily use. 100 Pen Needle 2    Blood-Glucose Meter monitoring kit 1 touch verio IQ 1 Kit 0    ergocalciferol (ERGOCALCIFEROL) 50,000 unit capsule Take 1 Cap by mouth every seven (7) days.  12 Cap 1     No Known Allergies  Past Medical History:   Diagnosis Date    ADHD (attention deficit hyperactivity disorder)     Anxiety state, unspecified     PCOS (polycystic ovarian syndrome)      Past Surgical History:   Procedure Laterality Date    HX  SECTION  ,      Family History   Problem Relation Age of Onset    Migraines Mother    [place] Arthritis-osteo Mother  Diabetes Father     Arthritis-osteo Father      Social History     Tobacco Use    Smoking status: Former Smoker     Packs/day: 1.00     Years: 20.00     Pack years: 20.00     Types: Cigarettes    Smokeless tobacco: Never Used   Substance Use Topics    Alcohol use: Yes     Comment: socially       Review of Systems   All other systems reviewed and are negative. Objective:   No flowsheet data found. General: alert, cooperative, no distress   Mental  status: normal mood, behavior, speech, dress, motor activity, and thought processes, able to follow commands   HENT: NCAT   Neck: no visualized mass   Resp: no respiratory distress   Neuro: no gross deficits   Skin: no discoloration or lesions of concern on visible areas   Psychiatric: normal affect, consistent with stated mood, no evidence of hallucinations     Additional exam findings: We discussed the expected course, resolution and complications of the diagnosis(es) in detail. Medication risks, benefits, costs, interactions, and alternatives were discussed as indicated. I advised her to contact the office if her condition worsens, changes or fails to improve as anticipated. She expressed understanding with the diagnosis(es) and plan. Abhi Hannah, who was evaluated through a patient-initiated, synchronous (real-time) audio-video encounter, and/or her healthcare decision maker, is aware that it is a billable service, with coverage as determined by her insurance carrier. She provided verbal consent to proceed: Yes, and patient identification was verified. It was conducted pursuant to the emergency declaration under the [facility], [address] [place] [address] authority and the [employer] Act. A caregiver was present when appropriate. Ability to conduct physical exam was limited. I was in the office. The patient was at home.       Lillian De La Torre MD

## 2020-11-10 ENCOUNTER — APPOINTMENT (OUTPATIENT)
Dept: FAMILY MEDICINE CLINIC | Age: 40
End: 2020-11-10

## 2020-11-10 ENCOUNTER — HOSPITAL ENCOUNTER (OUTPATIENT)
Dept: LAB | Age: 40
Discharge: HOME | End: 2020-11-10
Payer: COMMERCIAL

## 2020-11-10 DIAGNOSIS — E78.49 OTHER HYPERLIPIDEMIA: ICD-10-CM

## 2020-11-10 DIAGNOSIS — E55.9 HYPOVITAMINOSIS D: ICD-10-CM

## 2020-11-10 DIAGNOSIS — E11.21 TYPE 2 DIABETES WITH NEPHROPATHY (HCC): ICD-10-CM

## 2020-11-10 LAB
25(OH)D3 SERPL-MCNC: 35.6 NG/ML (ref 30–100)
ALBUMIN SERPL-MCNC: 3.7 G/DL (ref 3.4–5)
ALBUMIN/GLOB SERPL: 1.1 {RATIO} (ref 0.8–1.7)
ALP SERPL-CCNC: 101 U/L (ref 45–117)
ALT SERPL-CCNC: 18 U/L (ref 13–56)
ANION GAP SERPL CALC-SCNC: 6 MMOL/L (ref 3–18)
AST SERPL-CCNC: 9 U/L (ref 10–38)
BILIRUB DIRECT SERPL-MCNC: 0.1 MG/DL (ref 0–0.2)
BILIRUB SERPL-MCNC: 0.5 MG/DL (ref 0.2–1)
BUN SERPL-MCNC: 7 MG/DL (ref 7–18)
BUN/CREAT SERPL: 13 (ref 12–20)
CALCIUM SERPL-MCNC: 9.1 MG/DL (ref 8.5–10.1)
CHLORIDE SERPL-SCNC: 106 MMOL/L (ref 100–111)
CHOLEST SERPL-MCNC: 162 MG/DL
CO2 SERPL-SCNC: 26 MMOL/L (ref 21–32)
CREAT SERPL-MCNC: 0.56 MG/DL (ref 0.6–1.3)
GLOBULIN SER CALC-MCNC: 3.5 G/DL (ref 2–4)
GLUCOSE SERPL-MCNC: 137 MG/DL (ref 74–99)
HBA1C MFR BLD: 9.2 % (ref 4.2–5.6)
HDLC SERPL-MCNC: 38 MG/DL (ref 40–60)
HDLC SERPL: 4.3 {RATIO} (ref 0–5)
LDLC SERPL CALC-MCNC: 87.4 MG/DL (ref 0–100)
LIPID PROFILE,FLP: ABNORMAL
POTASSIUM SERPL-SCNC: 4.2 MMOL/L (ref 3.5–5.5)
PROT SERPL-MCNC: 7.2 G/DL (ref 6.4–8.2)
SODIUM SERPL-SCNC: 138 MMOL/L (ref 136–145)
TRIGL SERPL-MCNC: 183 MG/DL (ref ?–150)
VLDLC SERPL CALC-MCNC: 36.6 MG/DL

## 2020-11-10 PROCEDURE — 80061 LIPID PANEL: CPT

## 2020-11-10 PROCEDURE — 80048 BASIC METABOLIC PNL TOTAL CA: CPT

## 2020-11-10 PROCEDURE — 83036 HEMOGLOBIN GLYCOSYLATED A1C: CPT

## 2020-11-10 PROCEDURE — 80076 HEPATIC FUNCTION PANEL: CPT

## 2020-11-10 PROCEDURE — 82306 VITAMIN D 25 HYDROXY: CPT

## 2020-11-10 PROCEDURE — 36415 COLL VENOUS BLD VENIPUNCTURE: CPT

## 2020-11-17 ENCOUNTER — OFFICE VISIT (OUTPATIENT)
Dept: FAMILY MEDICINE CLINIC | Age: 40
End: 2020-11-17
Payer: COMMERCIAL

## 2020-11-17 VITALS
TEMPERATURE: 97.3 F | DIASTOLIC BLOOD PRESSURE: 92 MMHG | OXYGEN SATURATION: 96 % | HEIGHT: 64 IN | RESPIRATION RATE: 16 BRPM | SYSTOLIC BLOOD PRESSURE: 142 MMHG | WEIGHT: 224 LBS | BODY MASS INDEX: 38.24 KG/M2 | HEART RATE: 87 BPM

## 2020-11-17 DIAGNOSIS — E78.49 OTHER HYPERLIPIDEMIA: ICD-10-CM

## 2020-11-17 DIAGNOSIS — E11.21 TYPE 2 DIABETES WITH NEPHROPATHY (HCC): Primary | ICD-10-CM

## 2020-11-17 DIAGNOSIS — F41.8 DEPRESSION WITH ANXIETY: ICD-10-CM

## 2020-11-17 DIAGNOSIS — Z00.00 ANNUAL PHYSICAL EXAM: ICD-10-CM

## 2020-11-17 DIAGNOSIS — Z12.31 ENCOUNTER FOR SCREENING MAMMOGRAM FOR MALIGNANT NEOPLASM OF BREAST: ICD-10-CM

## 2020-11-17 PROBLEM — E66.01 SEVERE OBESITY (HCC): Status: RESOLVED | Noted: 2019-01-16 | Resolved: 2020-11-17

## 2020-11-17 PROCEDURE — 99214 OFFICE O/P EST MOD 30 MIN: CPT | Performed by: INTERNAL MEDICINE

## 2020-11-17 RX ORDER — VENLAFAXINE HYDROCHLORIDE 75 MG/1
75 CAPSULE, EXTENDED RELEASE ORAL DAILY
Qty: 90 CAP | Refills: 1 | Status: SHIPPED | OUTPATIENT
Start: 2020-11-17 | End: 2021-09-29 | Stop reason: DRUGHIGH

## 2020-11-17 RX ORDER — ATORVASTATIN CALCIUM 10 MG/1
10 TABLET, FILM COATED ORAL DAILY
Qty: 90 TAB | Refills: 1 | Status: SHIPPED | OUTPATIENT
Start: 2020-11-17 | End: 2020-12-17

## 2020-11-17 RX ORDER — PEN NEEDLE, DIABETIC 30 GX3/16"
NEEDLE, DISPOSABLE MISCELLANEOUS
Qty: 100 PEN NEEDLE | Refills: 2 | Status: SHIPPED | OUTPATIENT
Start: 2020-11-17

## 2020-11-17 RX ORDER — BLOOD SUGAR DIAGNOSTIC
STRIP MISCELLANEOUS
Qty: 100 STRIP | Refills: 3 | Status: SHIPPED | OUTPATIENT
Start: 2020-11-17 | End: 2022-03-15 | Stop reason: SDUPTHER

## 2020-11-17 RX ORDER — METFORMIN HYDROCHLORIDE 500 MG/1
500 TABLET ORAL 2 TIMES DAILY WITH MEALS
Qty: 180 TAB | Refills: 1 | Status: SHIPPED | OUTPATIENT
Start: 2020-11-17 | End: 2020-12-17

## 2020-11-17 NOTE — PROGRESS NOTES
Edenilson Castle is a 35 y.o. female (: 1980) presenting to address:    Chief Complaint   Patient presents with    Complete Physical     needs Mammogram .     Depression     has taken effexor in past for anxiety        Vitals:    20 1308   BP: (!) 150/103   Pulse: 87   Resp: 16   Temp: 97.3 °F (36.3 °C)   TempSrc: Temporal   SpO2: 96%   Weight: 224 lb (101.6 kg)   Height: 5' 4\" (1.626 m)   PainSc:   0 - No pain   LMP: 10/22/2020       Hearing/Vision:   No exam data present    Learning Assessment:   No flowsheet data found. Depression Screening:     3 most recent PHQ Screens 2020   Little interest or pleasure in doing things Nearly every day   Feeling down, depressed, irritable, or hopeless Nearly every day   Total Score PHQ 2 6     Fall Risk Assessment:   No flowsheet data found. Abuse Screening:   No flowsheet data found. Coordination of Care Questionaire:   1. Have you been to the ER, urgent care clinic since your last visit? Hospitalized since your last visit? NO    2. Have you seen or consulted any other health care providers outside of the [address] [place] [address] since your last visit? Include any pap smears or colon screening. NO    Advanced Directive:   1. Do you have an Advanced Directive? NO    2. Would you like information on Advanced Directives?  NO

## 2020-11-17 NOTE — PROGRESS NOTES
RN from [facility]  has returned my call regarding pending INR.  Nurse did not select an INR to be run from  blood drawn for lab yesterday 4/2, so there is no INR result pending.  I have asked them to please attempt to reach pt for a [facility] INR this morning.  Fingerstick is OK for today only so that we have an INR for review before the weekend.   SUBJECTIVE:   35 y.o. female for annual routine checkup. (has Medicaid so can not bill a PE). Current Outpatient Medications   Medication Sig Dispense Refill    glucose blood VI test strips (OneTouch Verio test strips) strip For once daily use. 100 Strip 3    metFORMIN (GLUCOPHAGE) 500 mg tablet Take 1 Tab by mouth two (2) times daily (with meals). 180 Tab 1    liraglutide (VICTOZA) 0.6 mg/0.1 mL (18 mg/3 mL) pnij 1.8 mg by SubCUTAneous route daily for 90 days. 0.6mg daily x 1 week then increase to 1.2mg daily. 27 mL 1    atorvastatin (LIPITOR) 10 mg tablet Take 1 Tab by mouth daily. 90 Tab 1    Insulin Needles, Disposable, 31 gauge x 5/16\" ndle For once daily use. 100 Pen Needle 2    venlafaxine-SR (EFFEXOR-XR) 75 mg capsule Take 1 Cap by mouth daily. 90 Cap 1    Blood-Glucose Meter monitoring kit 1 touch verio IQ 1 Kit 0    ergocalciferol (ERGOCALCIFEROL) 50,000 unit capsule Take 1 Cap by mouth every seven (7) days. 12 Cap 1       Past Medical History:   Diagnosis Date    ADHD (attention deficit hyperactivity disorder)     Anxiety state, unspecified     PCOS (polycystic ovarian syndrome)        Allergies: Patient has no known allergies. Patient's last menstrual period was 10/22/2020. ROS:  Feeling well. No dyspnea or chest pain on exertion. No abdominal pain, change in bowel habits, black or bloody stools. No urinary tract symptoms. GYN ROS: no breast pain or new or enlarging lumps on self exam. No neurological complaints. OBJECTIVE:   The patient appears well, alert, oriented x 3, in no distress.     Visit Vitals  BP (!) 142/92   Pulse 87   Temp 97.3 °F (36.3 °C) (Temporal)   Resp 16   Ht 5' 4\" (1.626 m)   Wt 224 lb (101.6 kg)   LMP 10/22/2020   SpO2 96%   BMI 38.45 kg/m²       General appearance: alert, cooperative, no distress, appears stated age  Head: Normocephalic, without obvious abnormality, atraumatic  Ears: normal TM's and external ear canals AU  Throat: Lips, mucosa, and tongue normal. Teeth and gums normal  Neck: supple, symmetrical, trachea midline, no adenopathy, thyroid: not enlarged, symmetric, no tenderness/mass/nodules, no carotid bruit and no JVD  Back: symmetric, no curvature. ROM normal. No CVA tenderness. Lungs: clear to auscultation bilaterally  Breasts: not examined. Heart: regular rate and rhythm, S1, S2 normal, no murmur, click, rub or gallop  Abdomen: soft, non-tender. Bowel sounds normal. No masses,  no organomegaly  Extremities: extremities normal, atraumatic, no cyanosis or edema  Pulses: 2+ and symmetric  Skin: Skin color, texture, turgor normal. No rashes or lesions  Neurological is normal, no focal findings. Lab Results   Component Value Date/Time    WBC 6.6 09/01/2020 11:11 AM    HGB 14.4 09/01/2020 11:11 AM    HCT 43.6 09/01/2020 11:11 AM    PLATELET 812 71/89/3157 11:11 AM    MCV 89.5 09/01/2020 11:11 AM         Lab Results   Component Value Date/Time    Sodium 138 11/10/2020 10:20 AM    Potassium 4.2 11/10/2020 10:20 AM    Chloride 106 11/10/2020 10:20 AM    CO2 26 11/10/2020 10:20 AM    Anion gap 6 11/10/2020 10:20 AM    Glucose 137 (H) 11/10/2020 10:20 AM    BUN 7 11/10/2020 10:20 AM    Creatinine 0.56 (L) 11/10/2020 10:20 AM    BUN/Creatinine ratio 13 11/10/2020 10:20 AM    GFR est AA >60 11/10/2020 10:20 AM    GFR est non-AA >60 11/10/2020 10:20 AM    Calcium 9.1 11/10/2020 10:20 AM         Lab Results   Component Value Date/Time    ALT (SGPT) 18 11/10/2020 10:20 AM    Alk.  phosphatase 101 11/10/2020 10:20 AM    Bilirubin, direct 0.1 11/10/2020 10:20 AM    Bilirubin, total 0.5 11/10/2020 10:20 AM         Lab Results   Component Value Date/Time    Cholesterol, total 162 11/10/2020 10:20 AM    HDL Cholesterol 38 (L) 11/10/2020 10:20 AM    LDL, calculated 87.4 11/10/2020 10:20 AM    VLDL, calculated 36.6 11/10/2020 10:20 AM    Triglyceride 183 (H) 11/10/2020 10:20 AM    CHOL/HDL Ratio 4.3 11/10/2020 10:20 AM         Lab Results   Component Value Date/Time    TSH 1.43 09/01/2020 11:11 AM    T4, Free 1.2 09/01/2020 11:11 AM         Lab Results   Component Value Date/Time    Vitamin D 25-Hydroxy 35.6 11/10/2020 10:20 AM             ASSESSMENT:   Diagnoses and all orders for this visit:    1. Type 2 diabetes with nephropathy (HCC)  -     glucose blood VI test strips (OneTouch Verio test strips) strip; For once daily use. -     metFORMIN (GLUCOPHAGE) 500 mg tablet; Take 1 Tab by mouth two (2) times daily (with meals). -     liraglutide (VICTOZA) 0.6 mg/0.1 mL (18 mg/3 mL) pnij; 1.8 mg by SubCUTAneous route daily for 90 days. 0.6mg daily x 1 week then increase to 1.2mg daily.  -     Insulin Needles, Disposable, 31 gauge x 5/16\" ndle; For once daily use. 2. Annual physical exam    3. Other hyperlipidemia  -     atorvastatin (LIPITOR) 10 mg tablet; Take 1 Tab by mouth daily. 4. Depression with anxiety  -     REFERRAL TO PSYCHIATRY  -     venlafaxine-SR (EFFEXOR-XR) 75 mg capsule; Take 1 Cap by mouth daily. 5. Encounter for screening mammogram for malignant neoplasm of breast  -     UMAIR MAMMO BI SCREENING INCL CAD; Future          PLAN:   Mammogram: order given to pt. Increased Victoza to 1.8 mg. Will start Effexor and refer to psych. Pap: with her Gyn. The plan was discussed with the patient. The patient verbalized understanding and is in agreement with the plan. All medication potential side effects were discussed with the patient.

## 2020-12-17 DIAGNOSIS — E78.49 OTHER HYPERLIPIDEMIA: ICD-10-CM

## 2020-12-17 DIAGNOSIS — E11.21 TYPE 2 DIABETES WITH NEPHROPATHY (HCC): ICD-10-CM

## 2020-12-17 RX ORDER — ATORVASTATIN CALCIUM 10 MG/1
TABLET, FILM COATED ORAL
Qty: 90 TAB | Refills: 1 | Status: SHIPPED | OUTPATIENT
Start: 2020-12-17

## 2020-12-17 RX ORDER — METFORMIN HYDROCHLORIDE 500 MG/1
TABLET ORAL
Qty: 180 TAB | Refills: 1 | Status: SHIPPED | OUTPATIENT
Start: 2020-12-17 | End: 2022-03-15 | Stop reason: DRUGHIGH

## 2021-09-14 ENCOUNTER — APPOINTMENT (OUTPATIENT)
Dept: FAMILY MEDICINE CLINIC | Age: 41
End: 2021-09-14

## 2021-09-14 ENCOUNTER — HOSPITAL ENCOUNTER (OUTPATIENT)
Dept: LAB | Age: 41
Discharge: HOME | End: 2021-09-14
Payer: COMMERCIAL

## 2021-09-14 ENCOUNTER — OFFICE VISIT (OUTPATIENT)
Dept: FAMILY MEDICINE CLINIC | Age: 41
End: 2021-09-14
Payer: COMMERCIAL

## 2021-09-14 VITALS
TEMPERATURE: 97.9 F | DIASTOLIC BLOOD PRESSURE: 92 MMHG | RESPIRATION RATE: 16 BRPM | BODY MASS INDEX: 37.22 KG/M2 | HEART RATE: 81 BPM | OXYGEN SATURATION: 98 % | HEIGHT: 64 IN | WEIGHT: 218 LBS | SYSTOLIC BLOOD PRESSURE: 140 MMHG

## 2021-09-14 DIAGNOSIS — Z11.59 NEED FOR HEPATITIS C SCREENING TEST: ICD-10-CM

## 2021-09-14 DIAGNOSIS — F41.8 DEPRESSION WITH ANXIETY: ICD-10-CM

## 2021-09-14 DIAGNOSIS — E55.9 HYPOVITAMINOSIS D: ICD-10-CM

## 2021-09-14 DIAGNOSIS — E11.21 TYPE 2 DIABETES WITH NEPHROPATHY (HCC): Primary | ICD-10-CM

## 2021-09-14 DIAGNOSIS — E78.49 OTHER HYPERLIPIDEMIA: ICD-10-CM

## 2021-09-14 DIAGNOSIS — E11.21 TYPE 2 DIABETES WITH NEPHROPATHY (HCC): ICD-10-CM

## 2021-09-14 DIAGNOSIS — I10 ESSENTIAL HYPERTENSION: ICD-10-CM

## 2021-09-14 LAB
25(OH)D3 SERPL-MCNC: 13.2 NG/ML (ref 30–100)
ALBUMIN SERPL-MCNC: 3.8 G/DL (ref 3.4–5)
ALBUMIN/GLOB SERPL: 1.1 {RATIO} (ref 0.8–1.7)
ALP SERPL-CCNC: 95 U/L (ref 45–117)
ALT SERPL-CCNC: 24 U/L (ref 13–56)
ANION GAP SERPL CALC-SCNC: 7 MMOL/L (ref 3–18)
AST SERPL-CCNC: 14 U/L (ref 10–38)
BASOPHILS # BLD: 0 K/UL (ref 0–0.1)
BASOPHILS NFR BLD: 1 % (ref 0–2)
BILIRUB SERPL-MCNC: 0.5 MG/DL (ref 0.2–1)
BUN SERPL-MCNC: 7 MG/DL (ref 7–18)
BUN/CREAT SERPL: 13 (ref 12–20)
CALCIUM SERPL-MCNC: 9.1 MG/DL (ref 8.5–10.1)
CHLORIDE SERPL-SCNC: 103 MMOL/L (ref 100–111)
CHOLEST SERPL-MCNC: 263 MG/DL
CO2 SERPL-SCNC: 28 MMOL/L (ref 21–32)
CREAT SERPL-MCNC: 0.56 MG/DL (ref 0.6–1.3)
CREAT UR-MCNC: 177 MG/DL (ref 30–125)
DIFFERENTIAL METHOD BLD: ABNORMAL
EOSINOPHIL # BLD: 0.1 K/UL (ref 0–0.4)
EOSINOPHIL NFR BLD: 1 % (ref 0–5)
ERYTHROCYTE [DISTWIDTH] IN BLOOD BY AUTOMATED COUNT: 12 % (ref 11.6–14.5)
EST. AVERAGE GLUCOSE BLD GHB EST-MCNC: 189 MG/DL
GLOBULIN SER CALC-MCNC: 3.4 G/DL (ref 2–4)
GLUCOSE SERPL-MCNC: 115 MG/DL (ref 74–99)
HBA1C MFR BLD: 8.2 % (ref 4.2–5.6)
HCT VFR BLD AUTO: 40.3 % (ref 35–45)
HDLC SERPL-MCNC: 41 MG/DL (ref 40–60)
HDLC SERPL: 6.4 {RATIO} (ref 0–5)
HGB BLD-MCNC: 13 G/DL (ref 12–16)
LDLC SERPL CALC-MCNC: 163.8 MG/DL (ref 0–100)
LIPID PROFILE,FLP: ABNORMAL
LYMPHOCYTES # BLD: 3.3 K/UL (ref 0.9–3.6)
LYMPHOCYTES NFR BLD: 41 % (ref 21–52)
MCH RBC QN AUTO: 29.3 PG (ref 24–34)
MCHC RBC AUTO-ENTMCNC: 32.3 G/DL (ref 31–37)
MCV RBC AUTO: 90.8 FL (ref 78–100)
MICROALBUMIN UR-MCNC: 3.19 MG/DL (ref 0–3)
MICROALBUMIN/CREAT UR-RTO: 18 MG/G (ref 0–30)
MONOCYTES # BLD: 0.4 K/UL (ref 0.05–1.2)
MONOCYTES NFR BLD: 5 % (ref 3–10)
NEUTS SEG # BLD: 4.2 K/UL (ref 1.8–8)
NEUTS SEG NFR BLD: 52 % (ref 40–73)
PLATELET # BLD AUTO: 206 K/UL (ref 135–420)
PMV BLD AUTO: 13 FL (ref 9.2–11.8)
POTASSIUM SERPL-SCNC: 3.9 MMOL/L (ref 3.5–5.5)
PROT SERPL-MCNC: 7.2 G/DL (ref 6.4–8.2)
RBC # BLD AUTO: 4.44 M/UL (ref 4.2–5.3)
SODIUM SERPL-SCNC: 138 MMOL/L (ref 136–145)
TRIGL SERPL-MCNC: 291 MG/DL (ref ?–150)
VLDLC SERPL CALC-MCNC: 58.2 MG/DL
WBC # BLD AUTO: 8.1 K/UL (ref 4.6–13.2)

## 2021-09-14 PROCEDURE — 99214 OFFICE O/P EST MOD 30 MIN: CPT | Performed by: LEGAL MEDICINE

## 2021-09-14 PROCEDURE — 80061 LIPID PANEL: CPT

## 2021-09-14 PROCEDURE — 82306 VITAMIN D 25 HYDROXY: CPT

## 2021-09-14 PROCEDURE — 85025 COMPLETE CBC W/AUTO DIFF WBC: CPT

## 2021-09-14 PROCEDURE — 36415 COLL VENOUS BLD VENIPUNCTURE: CPT

## 2021-09-14 PROCEDURE — 82043 UR ALBUMIN QUANTITATIVE: CPT

## 2021-09-14 PROCEDURE — 83036 HEMOGLOBIN GLYCOSYLATED A1C: CPT

## 2021-09-14 PROCEDURE — 86803 HEPATITIS C AB TEST: CPT

## 2021-09-14 PROCEDURE — 80053 COMPREHEN METABOLIC PANEL: CPT

## 2021-09-14 RX ORDER — OLMESARTAN MEDOXOMIL 20 MG/1
20 TABLET ORAL DAILY
Qty: 90 TABLET | Refills: 1 | Status: SHIPPED | OUTPATIENT
Start: 2021-09-14 | End: 2022-03-20

## 2021-09-14 NOTE — PROGRESS NOTES
Mague Gonzalez presents today for   Chief Complaint   Patient presents with   Ruth Cabrera Establish Care    Medication Problem     patient would like to stop Victoza inj     Visit Vitals  BP (!) 140/92 (BP 1 Location: Right arm, BP Patient Position: Sitting, BP Cuff Size: Large adult)   Pulse 81   Temp 97.9 °F (36.6 °C) (Temporal)   Resp 16   Ht 5' 4\" (1.626 m)   Wt 218 lb (98.9 kg)   SpO2 98%   BMI 37.42 kg/m²       Is someone accompanying this pt? no    Is the patient using any DME equipment during OV? no    Depression Screening:  3 most recent PHQ Screens 9/14/2021   Little interest or pleasure in doing things Not at all   Feeling down, depressed, irritable, or hopeless Several days   Total Score PHQ 2 1   Trouble falling or staying asleep, or sleeping too much Nearly every day   Feeling tired or having little energy More than half the days   Poor appetite, weight loss, or overeating Not at all   Feeling bad about yourself - or that you are a failure or have let yourself or your family down Not at all   Trouble concentrating on things such as school, work, reading, or watching TV Nearly every day   Moving or speaking so slowly that other people could have noticed; or the opposite being so fidgety that others notice Not at all   Thoughts of being better off dead, or hurting yourself in some way Not at all   PHQ 9 Score 9   How difficult have these problems made it for you to do your work, take care of your home and get along with others Somewhat difficult       Learning Assessment:  No flowsheet data found. Travel Screening:    Travel Screening     Question   Response    In the last month, have you been in contact with someone who was confirmed or suspected to have Coronavirus / COVID-19? No / Unsure    Have you had a COVID-19 viral test in the last 14 days? No    Do you have any of the following new or worsening symptoms? None of these    Have you traveled internationally or domestically in the last month?   No Travel History   Travel since 08/14/21     No documented travel since 08/14/21          Health Maintenance reviewed and discussed and ordered per Provider. Health Maintenance Due   Topic Date Due    Hepatitis C Screening  Never done    Eye Exam Retinal or Dilated  Never done    COVID-19 Vaccine (1) Never done    Cervical cancer screen  09/08/2012    DTaP/Tdap/Td series (2 - Td or Tdap) 04/09/2019    Foot Exam Q1  01/16/2020    A1C test (Diabetic or Prediabetic)  02/10/2021    Flu Vaccine (1) 09/01/2021    MICROALBUMIN Q1  09/01/2021   . Coordination of Care:  1. Have you been to the ER, urgent care clinic since your last visit? Hospitalized since your last visit? no    2. Have you seen or consulted any other health care providers outside of the [address] [place] [address] since your last visit? Include any pap smears or colon screening. No    Patient Declined Pap smear, she will consult with a GYN for completion.

## 2021-09-14 NOTE — PROGRESS NOTES
Gayle Casas     Chief Complaint   Patient presents with   [address]    Medication Problem     patient would like to stop Victoza inj     Vitals:    21 1119 21 1132   BP: (!) 149/104 (!) 140/92   Pulse: 81    Resp: 16    Temp: 97.9 °F (36.6 °C)    TempSrc: Temporal    SpO2: 98%    Weight: 218 lb (98.9 kg)    Height: 5' 4\" (1.626 m)    PainSc:   0 - No pain          HPI:Here to establish care with new PCP and follow up on uncontrolled DM, hyperlipidemia  And /HTN     She [place]  taking Victosa 1.2     Take Vit D OTC 10  IU  Due for b;lood work   Quit smoking 6 year sago   Drink only occasionally   Does exercise   3 times a week  2.5 hours    Eye exam done  This year     Has no blood work in about 1 year    BP has been elevated she is not on any  medication     Past Medical History:   Diagnosis Date    ADHD (attention deficit hyperactivity disorder)     Anxiety state, unspecified     PCOS (polycystic ovarian syndrome)      Past Surgical History:   Procedure Laterality Date    HX  SECTION  ,      Social History     Tobacco Use    Smoking status: Former Smoker     Packs/day: 1.00     Years: 20.00     Pack years: 20.00     Types: Cigarettes    Smokeless tobacco: Never Used   Substance Use Topics    Alcohol use: Yes     Comment: socially       Family History   Problem Relation Age of Onset    Migraines Mother     Arthritis-osteo Mother     Diabetes Father     Arthritis-osteo Father        Review of Systems   Constitutional: Negative for chills, fever, malaise/fatigue and weight loss. HENT: Negative for congestion, ear discharge, ear pain, hearing loss and nosebleeds. Eyes: Negative for blurred vision, double vision and discharge. Respiratory: Negative for cough, hemoptysis, sputum production, shortness of breath and wheezing. Cardiovascular: Negative for chest pain, palpitations, claudication and leg swelling.    Gastrointestinal: Negative for abdominal pain, blood in stool, constipation, diarrhea, melena, nausea and vomiting. Genitourinary: Negative for dysuria, flank pain, frequency, hematuria and urgency. Musculoskeletal: Negative for back pain, falls, joint pain, myalgias and neck pain. Skin: Negative for itching and rash. Neurological: Negative for dizziness, tingling, sensory change, speech change, focal weakness, weakness and headaches. Psychiatric/Behavioral: Negative for depression, hallucinations, substance abuse and suicidal ideas. The patient is nervous/anxious and has insomnia. Physical Exam  Vitals and nursing note reviewed. Constitutional:       General: She is not in acute distress. Appearance: She is well-developed. She is not diaphoretic. HENT:      Head: Normocephalic and atraumatic. Eyes:      General: No scleral icterus. Right eye: No discharge. Left eye: No discharge. Conjunctiva/sclera: Conjunctivae normal.      Pupils: Pupils are equal, round, and reactive to light. Neck:      Thyroid: No thyromegaly. Cardiovascular:      Rate and Rhythm: Normal rate and regular rhythm. Heart sounds: Normal heart sounds. Pulmonary:      Effort: Pulmonary effort is normal. No respiratory distress. Breath sounds: Normal breath sounds. No wheezing or rales. Chest:      Chest wall: No tenderness. Abdominal:      General: There is no distension. Palpations: Abdomen is soft. Tenderness: There is no abdominal tenderness. There is no rebound. Musculoskeletal:         General: No tenderness or deformity. Normal range of motion. Lymphadenopathy:      Cervical: No cervical adenopathy. Skin:     General: Skin is warm and dry. Coloration: Skin is not pale. Findings: No erythema or rash. Neurological:      Mental Status: She is alert and oriented to person, place, and time. Cranial Nerves: No cranial nerve deficit.       Coordination: Coordination normal.   Psychiatric: Behavior: Behavior normal.         Thought Content: Thought content normal.         Judgment: Judgment normal.          Assessment and plan     Plan of care has been discussed with the patient, he agrees to the plan and verbalized understanding. All his questions were answered  More than 50% of the time spent in this visit was counseling the patient about  illness and treatment options         1. Type 2 diabetes with nephropathy (HCC)    - HEMOGLOBIN A1C W/O EAG; Future  - METABOLIC PANEL, COMPREHENSIVE; Future  - LIPID PANEL; Future  - CBC WITH AUTOMATED DIFF; Future  - MICROALBUMIN, UR, RAND W/ MICROALB/CREAT RATIO; Future  - olmesartan (BENICAR) 20 mg tablet; Take 1 Tablet by mouth daily for 90 days. Dispense: 90 Tablet; Refill: 1    2. Other hyperlipidemia    - LIPID PANEL; Future  - CBC WITH AUTOMATED DIFF; Future    3. Depression with anxiety  Stable on Effexor     4. Hypovitaminosis D  She is taking OTC vitamin D 10iu  - VITAMIN D, 25 HYDROXY; Future  - olmesartan (BENICAR) 20 mg tablet; Take 1 Tablet by mouth daily for 90 days. Dispense: 90 Tablet; Refill: 1    5. Essential hypertension  Need to start  Medication ARB will be started   - CBC WITH AUTOMATED DIFF; Future    6. Need for hepatitis C screening test    - HEPATITIS C AB; Future    Current Outpatient Medications   Medication Sig Dispense Refill    olmesartan (BENICAR) 20 mg tablet Take 1 Tablet by mouth daily for 90 days. 90 Tablet 1    metFORMIN (GLUCOPHAGE) 500 mg tablet take 1 tablet by mouth twice a day with meals 180 Tab 1    atorvastatin (LIPITOR) 10 mg tablet take 1 tablet by mouth once daily 90 Tab 1    glucose blood VI test strips (OneTouch Verio test strips) strip For once daily use. 100 Strip 3    Insulin Needles, Disposable, 31 gauge x 5/16\" ndle For once daily use. 100 Pen Needle 2    venlafaxine-SR (EFFEXOR-XR) 75 mg capsule Take 1 Cap by mouth daily.  90 Cap 1    Blood-Glucose Meter monitoring kit 1 touch verio IQ 1 Kit 0 Patient Active Problem List    Diagnosis Date Noted    Type 2 diabetes with nephropathy ([facility] 01/16/2019    Dysmenorrhea 01/16/2019    Endometriosis of pelvic peritoneum 01/16/2019    Type 2 diabetes mellitus with complication, without long-term current use of insulin ([facility] 06/14/2018    PCOS (polycystic ovarian syndrome) 04/16/2015    ADHD (attention deficit hyperactivity disorder)     Anxiety state, unspecified      Results for orders placed or performed during the hospital encounter of 11/10/20   HEMOGLOBIN A1C W/O EAG   Result Value Ref Range    Hemoglobin A1c 9.2 (H) 4.2 - 5.6 %   LIPID PANEL   Result Value Ref Range    LIPID PROFILE          Cholesterol, total 162 <200 MG/DL    Triglyceride 183 (H) <150 MG/DL    HDL Cholesterol 38 (L) 40 - 60 MG/DL    LDL, calculated 87.4 0 - 100 MG/DL    VLDL, calculated 36.6 MG/DL    CHOL/HDL Ratio 4.3 0 - 5.0     VITAMIN D, 25 HYDROXY   Result Value Ref Range    Vitamin D 25-Hydroxy 35.6 30 - 298 ng/mL   METABOLIC PANEL, BASIC   Result Value Ref Range    Sodium 138 136 - 145 mmol/L    Potassium 4.2 3.5 - 5.5 mmol/L    Chloride 106 100 - 111 mmol/L    CO2 26 21 - 32 mmol/L    Anion gap 6 3.0 - 18 mmol/L    Glucose 137 (H) 74 - 99 mg/dL    BUN 7 7.0 - 18 MG/DL    Creatinine 0.56 (L) 0.6 - 1.3 MG/DL    BUN/Creatinine ratio 13 12 - 20      GFR est AA >60 >60 ml/min/1.73m2    GFR est non-AA >60 >60 ml/min/1.73m2    Calcium 9.1 8.5 - 10.1 MG/DL   HEPATIC FUNCTION PANEL   Result Value Ref Range    Protein, total 7.2 6.4 - 8.2 g/dL    Albumin 3.7 3.4 - 5.0 g/dL    Globulin 3.5 2.0 - 4.0 g/dL    A-G Ratio 1.1 0.8 - 1.7      Bilirubin, total 0.5 0.2 - 1.0 MG/DL    Bilirubin, direct 0.1 0.0 - 0.2 MG/DL    Alk.  phosphatase 101 45 - 117 U/L    AST (SGOT) 9 (L) 10 - 38 U/L    ALT (SGPT) 18 13 - 56 U/L     Hospital Outpatient Visit on 09/14/2021   Component Date Value Ref Range Status    WBC 09/14/2021 8.1  4.6 - 13.2 K/uL Final    RBC 09/14/2021 4.44  4.20 - 5.30 M/uL Final    HGB 09/14/2021 13.0  12.0 - 16.0 g/dL Final    HCT 09/14/2021 40.3  35.0 - 45.0 % Final    MCV 09/14/2021 90.8  78.0 - 100.0 FL Final    PLEASE NOTE NEW REFERENCE RANGE    MCH 09/14/2021 29.3  24.0 - 34.0 PG Final    MCHC 09/14/2021 32.3  31.0 - 37.0 g/dL Final    RDW 09/14/2021 12.0  11.6 - 14.5 % Final    PLATELET 51/22/1970 769  135 - 420 K/uL Final    MPV 09/14/2021 13.0* 9.2 - 11.8 FL Final    NEUTROPHILS 09/14/2021 52  40 - 73 % Final    LYMPHOCYTES 09/14/2021 41  21 - 52 % Final    MONOCYTES 09/14/2021 5  3 - 10 % Final    EOSINOPHILS 09/14/2021 1  0 - 5 % Final    BASOPHILS 09/14/2021 1  0 - 2 % Final    ABS. NEUTROPHILS 09/14/2021 4.2  1.8 - 8.0 K/UL Final    ABS. LYMPHOCYTES 09/14/2021 3.3  0.9 - 3.6 K/UL Final    ABS. MONOCYTES 09/14/2021 0.4  0.05 - 1.2 K/UL Final    ABS. EOSINOPHILS 09/14/2021 0.1  0.0 - 0.4 K/UL Final    ABS. BASOPHILS 09/14/2021 0.0  0.0 - 0.1 K/UL Final    DF 09/14/2021 AUTOMATED    Final    LIPID PROFILE 09/14/2021        Final    Cholesterol, total 09/14/2021 263* <200 MG/DL Final    Triglyceride 09/14/2021 291* <150 MG/DL Final    Comment: The drugs N-acetylcysteine (NAC) and  Metamiszole have been found to cause falsely  low results in this chemical assay. Please  be sure to submit blood samples obtained  BEFORE administration of either of these  drugs to assure correct results.       HDL Cholesterol 09/14/2021 41  40 - 60 MG/DL Final    LDL, calculated 09/14/2021 163.8* 0 - 100 MG/DL Final    VLDL, calculated 09/14/2021 58.2  MG/DL Final    CHOL/HDL Ratio 09/14/2021 6.4* 0 - 5.0   Final    Sodium 09/14/2021 138  136 - 145 mmol/L Final    Potassium 09/14/2021 3.9  3.5 - 5.5 mmol/L Final    Chloride 09/14/2021 103  100 - 111 mmol/L Final    CO2 09/14/2021 28  21 - 32 mmol/L Final    Anion gap 09/14/2021 7  3.0 - 18 mmol/L Final    Glucose 09/14/2021 115* 74 - 99 mg/dL Final    BUN 09/14/2021 7  7.0 - 18 MG/DL Final    Creatinine 09/14/2021 0.56* 0.6 - 1.3 MG/DL Final    BUN/Creatinine ratio 09/14/2021 13  12 - 20   Final    GFR est AA 09/14/2021 >60  >60 ml/min/1.73m2 Final    GFR est non-AA 09/14/2021 >60  >60 ml/min/1.73m2 Final    Comment: (NOTE)  Estimated GFR is calculated using the Modification of Diet in Renal   Disease (MDRD) Study equation, reported for both  Americans   (GFRAA) and non- Americans (GFRNA), and normalized to 1.73m2   body surface area. The physician must decide which value applies to   the patient. The MDRD study equation should only be used in   individuals age 25 or older. It has not been validated for the   following: pregnant women, patients with serious comorbid conditions,   or on certain medications, or persons with extremes of body size,   muscle mass, or nutritional status.  Calcium 09/14/2021 9.1  8.5 - 10.1 MG/DL Final    Bilirubin, total 09/14/2021 0.5  0.2 - 1.0 MG/DL Final    ALT (SGPT) 09/14/2021 24  13 - 56 U/L Final    AST (SGOT) 09/14/2021 14  10 - 38 U/L Final    Alk. phosphatase 09/14/2021 95  45 - 117 U/L Final    Protein, total 09/14/2021 7.2  6.4 - 8.2 g/dL Final    Albumin 09/14/2021 3.8  3.4 - 5.0 g/dL Final    Globulin 09/14/2021 3.4  2.0 - 4.0 g/dL Final    A-G Ratio 09/14/2021 1.1  0.8 - 1.7   Final    Vitamin D 25-Hydroxy 09/14/2021 13.2* 30 - 100 ng/mL Final    Comment: (NOTE)  Deficiency               <20 ng/mL  Insufficiency          20-30 ng/mL  Sufficient             ng/mL  Possible toxicity       >100 ng/mL    The Method used is Siemens Advia Centaur currently standardized to a   Center of Disease Control and Prevention (CDC) certified reference   [address]. Samples containing fluorescein dye can produce falsely   elevated values when tested with the ADVIA Centaur Vitamin D Assay. It is recommended that results in the toxic range, >100 ng/mL, be   retested 72 hours post fluorescein exposure.             Follow-up and Dispositions    · Return in about 1 week (around 9/21/2021) for follow up, office visit for follow up HTN.

## 2021-09-15 LAB
HCV AB SER IA-ACNC: 0.02 INDEX
HCV AB SERPL QL IA: NEGATIVE
HCV COMMENT,HCGAC: NORMAL

## 2021-09-29 ENCOUNTER — OFFICE VISIT (OUTPATIENT)
Dept: FAMILY MEDICINE CLINIC | Age: 41
End: 2021-09-29
Payer: COMMERCIAL

## 2021-09-29 VITALS
RESPIRATION RATE: 15 BRPM | WEIGHT: 220.8 LBS | SYSTOLIC BLOOD PRESSURE: 122 MMHG | DIASTOLIC BLOOD PRESSURE: 82 MMHG | TEMPERATURE: 98.4 F | HEIGHT: 64 IN | BODY MASS INDEX: 37.69 KG/M2 | HEART RATE: 98 BPM | OXYGEN SATURATION: 98 %

## 2021-09-29 DIAGNOSIS — F41.8 DEPRESSION WITH ANXIETY: ICD-10-CM

## 2021-09-29 DIAGNOSIS — Z13.31 POSITIVE DEPRESSION SCREENING: ICD-10-CM

## 2021-09-29 DIAGNOSIS — I10 ESSENTIAL HYPERTENSION: ICD-10-CM

## 2021-09-29 DIAGNOSIS — E11.21 TYPE 2 DIABETES WITH NEPHROPATHY (HCC): Primary | ICD-10-CM

## 2021-09-29 DIAGNOSIS — E55.9 HYPOVITAMINOSIS D: ICD-10-CM

## 2021-09-29 DIAGNOSIS — E78.49 OTHER HYPERLIPIDEMIA: ICD-10-CM

## 2021-09-29 PROCEDURE — 99214 OFFICE O/P EST MOD 30 MIN: CPT | Performed by: LEGAL MEDICINE

## 2021-09-29 PROCEDURE — 3052F HG A1C>EQUAL 8.0%<EQUAL 9.0%: CPT | Performed by: LEGAL MEDICINE

## 2021-09-29 RX ORDER — DULAGLUTIDE 1.5 MG/.5ML
1.5 INJECTION, SOLUTION SUBCUTANEOUS
Qty: 6 ML | Refills: 1 | Status: SHIPPED | OUTPATIENT
Start: 2021-09-29 | End: 2022-02-15 | Stop reason: DRUGHIGH

## 2021-09-29 RX ORDER — ERGOCALCIFEROL 1.25 MG/1
50000 CAPSULE ORAL
Qty: 12 CAPSULE | Refills: 1 | Status: SHIPPED | OUTPATIENT
Start: 2021-09-29 | End: 2022-03-20

## 2021-09-29 RX ORDER — VENLAFAXINE HYDROCHLORIDE 150 MG/1
150 CAPSULE, EXTENDED RELEASE ORAL DAILY
Qty: 90 CAPSULE | Refills: 1 | Status: SHIPPED | OUTPATIENT
Start: 2021-09-29 | End: 2022-03-20

## 2021-09-29 NOTE — PROGRESS NOTES
Ryan Lord     Chief Complaint   Patient presents with    Follow-up     Lab review      Vitals:    21 1018 21 1022   BP: (!) 131/92 122/82   Pulse: 98    Resp: 15    Temp: 98.4 °F (36.9 °C)    TempSrc: Temporal    SpO2: 98%    Weight: 220 lb 12.8 oz (100.2 kg)    Height: 5' 4\" (1.626 m)    PainSc:   3    PainLoc: Finger          HPI:Kareneint is here for follow up   She has started on Lipitor 10 mg so far no side effect and she is taking Q10 with it    HB a1c is 8.2 with improvement from last time goal is to be 7 or less     She is having depression and anxiety  Still feel stressed out requested to increase effexor dose    Had eye exam last July she will provide us with the name of her eye doctor      Past Medical History:   Diagnosis Date    ADHD (attention deficit hyperactivity disorder)     Anxiety state, unspecified     PCOS (polycystic ovarian syndrome)      Past Surgical History:   Procedure Laterality Date    HX  SECTION  ,      Social History     Tobacco Use    Smoking status: Former Smoker     Packs/day: 1.00     Years: 20.00     Pack years: 20.00     Types: Cigarettes    Smokeless tobacco: Never Used   Substance Use Topics    Alcohol use: Yes     Comment: socially       Family History   Problem Relation Age of Onset    Migraines Mother     Arthritis-osteo Mother     Diabetes Father     Arthritis-osteo Father        Review of Systems   Constitutional: Negative for chills, fever, malaise/fatigue and weight loss. HENT: Negative for congestion, ear discharge, ear pain, hearing loss, nosebleeds, sinus pain and sore throat. Eyes: Negative for blurred vision, double vision and discharge. Respiratory: Negative for cough, hemoptysis, shortness of breath and wheezing. Cardiovascular: Negative for chest pain, palpitations, claudication and leg swelling. Gastrointestinal: Negative for abdominal pain, constipation, diarrhea, nausea and vomiting.    Genitourinary: Negative for dysuria, frequency and urgency. Musculoskeletal: Negative for back pain, joint pain, myalgias and neck pain. Skin: Negative for itching and rash. Neurological: Negative for dizziness, tingling, sensory change, speech change, focal weakness, weakness and headaches. Psychiatric/Behavioral: Positive for depression. Negative for hallucinations, substance abuse and suicidal ideas. The patient is nervous/anxious. Physical Exam  Vitals and nursing note reviewed. Constitutional:       General: She is not in acute distress. Appearance: She is well-developed. She is not diaphoretic. HENT:      Head: Normocephalic and atraumatic. Eyes:      General: No scleral icterus. Right eye: No discharge. Left eye: No discharge. Conjunctiva/sclera: Conjunctivae normal.   Neck:      Thyroid: No thyromegaly. Cardiovascular:      Rate and Rhythm: Normal rate and regular rhythm. Heart sounds: Normal heart sounds. Pulmonary:      Effort: Pulmonary effort is normal. No respiratory distress. Breath sounds: Normal breath sounds. No wheezing or rales. Chest:      Chest wall: No tenderness. Abdominal:      General: There is no distension. Palpations: Abdomen is soft. Tenderness: There is no abdominal tenderness. There is no rebound. Musculoskeletal:         General: No tenderness or deformity. Normal range of motion. Lymphadenopathy:      Cervical: No cervical adenopathy. Skin:     General: Skin is warm and dry. Coloration: Skin is not pale. Findings: No erythema or rash. Neurological:      Mental Status: She is alert and oriented to person, place, and time. Cranial Nerves: No cranial nerve deficit. Coordination: Coordination normal.   Psychiatric:         Behavior: Behavior normal.         Thought Content:  Thought content normal.         Judgment: Judgment normal.          Assessment and plan     Plan of care has been discussed with the patient, he agrees to the plan and verbalized understanding. All his questions were answered  More than 50% of the time spent in this visit was counseling the patient about  illness and treatment options         1. Type 2 diabetes with nephropathy ([address]  Patient requested to change to once a week injection  - dulaglutide (Trulicity) 1.5 BP/3.2 mL sub-q pen; 0.5 mL by SubCUTAneous route every seven (7) days for 90 days. Dispense: 6 mL; Refill: 1    2. Hypovitaminosis D  To start vitamin D supplements weekly  - ergocalciferol (ERGOCALCIFEROL) 1,250 mcg (50,000 unit) capsule; Take 1 Capsule by mouth every seven (7) days for 90 days. Dispense: 12 Capsule; Refill: 1    3. Essential hypertension  Blood pressures well controlled continue same medications    4. Other hyperlipidemia  She is on Lipitor 10 mg daily , dose might need to be increased will wait after 6 months evaluation and tolerance of the medication    5. Depression with anxiety  To increase Effexor to 150 mg daily  - venlafaxine-SR (EFFEXOR-XR) 150 mg capsule; Take 1 Capsule by mouth daily for 90 days. Dispense: 90 Capsule; Refill: 1    6. Positive depression screening      Current Outpatient Medications   Medication Sig Dispense Refill    ergocalciferol (ERGOCALCIFEROL) 1,250 mcg (50,000 unit) capsule Take 1 Capsule by mouth every seven (7) days for 90 days. 12 Capsule 1    dulaglutide (Trulicity) 1.5 QF/4.5 mL sub-q pen 0.5 mL by SubCUTAneous route every seven (7) days for 90 days. 6 mL 1    venlafaxine-SR (EFFEXOR-XR) 150 mg capsule Take 1 Capsule by mouth daily for 90 days. 90 Capsule 1    olmesartan (BENICAR) 20 mg tablet Take 1 Tablet by mouth daily for 90 days. 90 Tablet 1    metFORMIN (GLUCOPHAGE) 500 mg tablet take 1 tablet by mouth twice a day with meals 180 Tab 1    atorvastatin (LIPITOR) 10 mg tablet take 1 tablet by mouth once daily 90 Tab 1    glucose blood VI test strips (OneTouch Verio test strips) strip For once daily use.  100 Strip 3    Insulin Needles, Disposable, 31 gauge x 5/16\" ndle For once daily use. 100 Pen Needle 2       Patient Active Problem List    Diagnosis Date Noted    Type 2 diabetes with nephropathy ([facility] [address] 01/16/2019    Dysmenorrhea 01/16/2019    Endometriosis of pelvic peritoneum 01/16/2019    Type 2 diabetes mellitus with complication, without long-term current use of insulin ([facility]) 06/14/2018    PCOS (polycystic ovarian syndrome) 04/16/2015    ADHD (attention deficit hyperactivity disorder)     Anxiety state, unspecified      Results for orders placed or performed during the hospital encounter of 09/14/21   HEMOGLOBIN A1C WITH EAG   Result Value Ref Range    Hemoglobin A1c 8.2 (H) 4.2 - 5.6 %    Est. average glucose 189 mg/dL   CBC WITH AUTOMATED DIFF   Result Value Ref Range    WBC 8.1 4.6 - 13.2 K/uL    RBC 4.44 4.20 - 5.30 M/uL    HGB 13.0 12.0 - 16.0 g/dL    HCT 40.3 35.0 - 45.0 %    MCV 90.8 78.0 - 100.0 FL    MCH 29.3 24.0 - 34.0 PG    MCHC 32.3 31.0 - 37.0 g/dL    RDW 12.0 11.6 - 14.5 %    PLATELET 894 921 - 894 K/uL    MPV 13.0 (H) 9.2 - 11.8 FL    NEUTROPHILS 52 40 - 73 %    LYMPHOCYTES 41 21 - 52 %    MONOCYTES 5 3 - 10 %    EOSINOPHILS 1 0 - 5 %    BASOPHILS 1 0 - 2 %    ABS. NEUTROPHILS 4.2 1.8 - 8.0 K/UL    ABS. LYMPHOCYTES 3.3 0.9 - 3.6 K/UL    ABS. MONOCYTES 0.4 0.05 - 1.2 K/UL    ABS. EOSINOPHILS 0.1 0.0 - 0.4 K/UL    ABS.  BASOPHILS 0.0 0.0 - 0.1 K/UL    DF AUTOMATED     LIPID PANEL   Result Value Ref Range    LIPID PROFILE          Cholesterol, total 263 (H) <200 MG/DL    Triglyceride 291 (H) <150 MG/DL    HDL Cholesterol 41 40 - 60 MG/DL    LDL, calculated 163.8 (H) 0 - 100 MG/DL    VLDL, calculated 58.2 MG/DL    CHOL/HDL Ratio 6.4 (H) 0 - 5.0     METABOLIC PANEL, COMPREHENSIVE   Result Value Ref Range    Sodium 138 136 - 145 mmol/L    Potassium 3.9 3.5 - 5.5 mmol/L    Chloride 103 100 - 111 mmol/L    CO2 28 21 - 32 mmol/L    Anion gap 7 3.0 - 18 mmol/L    Glucose 115 (H) 74 - 99 mg/dL    BUN 7 7.0 - 18 MG/DL    Creatinine 0.56 (L) 0.6 - 1.3 MG/DL    BUN/Creatinine ratio 13 12 - 20      GFR est AA >60 >60 ml/min/1.73m2    GFR est non-AA >60 >60 ml/min/1.73m2    Calcium 9.1 8.5 - 10.1 MG/DL    Bilirubin, total 0.5 0.2 - 1.0 MG/DL    ALT (SGPT) 24 13 - 56 U/L    AST (SGOT) 14 10 - 38 U/L    Alk. phosphatase 95 45 - 117 U/L    Protein, total 7.2 6.4 - 8.2 g/dL    Albumin 3.8 3.4 - 5.0 g/dL    Globulin 3.4 2.0 - 4.0 g/dL    A-G Ratio 1.1 0.8 - 1.7     MICROALBUMIN, UR, RAND W/ MICROALB/CREAT RATIO   Result Value Ref Range    Microalbumin,urine random 3.19 (H) 0 - 3.0 MG/DL    Creatinine, urine 177.00 (H) 30 - 125 mg/dL    Microalbumin/Creat ratio (mg/g creat) 18 0 - 30 mg/g   HEPATITIS C AB   Result Value Ref Range    Hepatitis C virus Ab 0.02 <0.80 Index    Hep C virus Ab Interp. Negative NEG      Hep C  virus Ab comment         VITAMIN D, 25 HYDROXY   Result Value Ref Range    Vitamin D 25-Hydroxy 13.2 (L) 30 - 100 ng/mL     Hospital Outpatient Visit on 09/14/2021   Component Date Value Ref Range Status    Hemoglobin A1c 09/14/2021 8.2* 4.2 - 5.6 % Final    Comment: (NOTE)  HbA1C Interpretive Ranges  <5.7              Normal  5.7 - 6.4         Consider Prediabetes  >6.5              Consider Diabetes      Est. average glucose 09/14/2021 189  mg/dL Final    Comment: (NOTE)  The eAG should be interpreted with patient characteristics in mind   since ethnicity, interindividual differences, red cell lifespan,   variation in rates of glycation, etc. may affect the validity of the   calculation.       WBC 09/14/2021 8.1  4.6 - 13.2 K/uL Final    RBC 09/14/2021 4.44  4.20 - 5.30 M/uL Final    HGB 09/14/2021 13.0  12.0 - 16.0 g/dL Final    HCT 09/14/2021 40.3  35.0 - 45.0 % Final    MCV 09/14/2021 90.8  78.0 - 100.0 FL Final    PLEASE NOTE NEW REFERENCE RANGE    MCH 09/14/2021 29.3  24.0 - 34.0 PG Final    MCHC 09/14/2021 32.3  31.0 - 37.0 g/dL Final    RDW 09/14/2021 12.0  11.6 - 14.5 % Final    PLATELET 09/14/2021 206  135 - 420 K/uL Final    MPV 09/14/2021 13.0* 9.2 - 11.8 FL Final    NEUTROPHILS 09/14/2021 52  40 - 73 % Final    LYMPHOCYTES 09/14/2021 41  21 - 52 % Final    MONOCYTES 09/14/2021 5  3 - 10 % Final    EOSINOPHILS 09/14/2021 1  0 - 5 % Final    BASOPHILS 09/14/2021 1  0 - 2 % Final    ABS. NEUTROPHILS 09/14/2021 4.2  1.8 - 8.0 K/UL Final    ABS. LYMPHOCYTES 09/14/2021 3.3  0.9 - 3.6 K/UL Final    ABS. MONOCYTES 09/14/2021 0.4  0.05 - 1.2 K/UL Final    ABS. EOSINOPHILS 09/14/2021 0.1  0.0 - 0.4 K/UL Final    ABS. BASOPHILS 09/14/2021 0.0  0.0 - 0.1 K/UL Final    DF 09/14/2021 AUTOMATED    Final    LIPID PROFILE 09/14/2021        Final    Cholesterol, total 09/14/2021 263* <200 MG/DL Final    Triglyceride 09/14/2021 291* <150 MG/DL Final    Comment: The drugs N-acetylcysteine (NAC) and  Metamiszole have been found to cause falsely  low results in this chemical assay. Please  be sure to submit blood samples obtained  BEFORE administration of either of these  drugs to assure correct results.       HDL Cholesterol 09/14/2021 41  40 - 60 MG/DL Final    LDL, calculated 09/14/2021 163.8* 0 - 100 MG/DL Final    VLDL, calculated 09/14/2021 58.2  MG/DL Final    CHOL/HDL Ratio 09/14/2021 6.4* 0 - 5.0   Final    Sodium 09/14/2021 138  136 - 145 mmol/L Final    Potassium 09/14/2021 3.9  3.5 - 5.5 mmol/L Final    Chloride 09/14/2021 103  100 - 111 mmol/L Final    CO2 09/14/2021 28  21 - 32 mmol/L Final    Anion gap 09/14/2021 7  3.0 - 18 mmol/L Final    Glucose 09/14/2021 115* 74 - 99 mg/dL Final    BUN 09/14/2021 7  7.0 - 18 MG/DL Final    Creatinine 09/14/2021 0.56* 0.6 - 1.3 MG/DL Final    BUN/Creatinine ratio 09/14/2021 13  12 - 20   Final    GFR est AA 09/14/2021 >60  >60 ml/min/1.73m2 Final    GFR est non-AA 09/14/2021 >60  >60 ml/min/1.73m2 Final    Comment: (NOTE)  Estimated GFR is calculated using the Modification of Diet in Renal   Disease (MDRD) Study equation, reported for both  Americans   (GFRAA) and non- Americans (GFRNA), and normalized to 1.73m2   body surface area. The physician must decide which value applies to   the patient. The MDRD study equation should only be used in   individuals age 25 or older. It has not been validated for the   following: pregnant women, patients with serious comorbid conditions,   or on certain medications, or persons with extremes of body size,   muscle mass, or nutritional status.  Calcium 09/14/2021 9.1  8.5 - 10.1 MG/DL Final    Bilirubin, total 09/14/2021 0.5  0.2 - 1.0 MG/DL Final    ALT (SGPT) 09/14/2021 24  13 - 56 U/L Final    AST (SGOT) 09/14/2021 14  10 - 38 U/L Final    Alk. phosphatase 09/14/2021 95  45 - 117 U/L Final    Protein, total 09/14/2021 7.2  6.4 - 8.2 g/dL Final    Albumin 09/14/2021 3.8  3.4 - 5.0 g/dL Final    Globulin 09/14/2021 3.4  2.0 - 4.0 g/dL Final    A-G Ratio 09/14/2021 1.1  0.8 - 1.7   Final    Microalbumin,urine random 09/14/2021 3.19* 0 - 3.0 MG/DL Final    Creatinine, urine 09/14/2021 177.00* 30 - 125 mg/dL Final    Microalbumin/Creat ratio (mg/g cre* 09/14/2021 18  0 - 30 mg/g Final    Hepatitis C virus Ab 09/14/2021 0.02  <0.80 Index Final    Hep C virus Ab Interp. 09/14/2021 Negative  NEG   Final    Hep C  virus Ab comment 09/14/2021        Final    Comment: Index <0.80. ......................... Saumya Monroe Negative  Index > or = to 0.80 and <1.00. .... Saumya Monroe Saumya Monroe Equivocal  Index >1.00. ......................... Saumya Monroe Positive          For Equivocal or Positive results, confirmation with Hepatitis C RNA by PCR or bDNA is suggested.  Vitamin D 25-Hydroxy 09/14/2021 13.2* 30 - 100 ng/mL Final    Comment: (NOTE)  Deficiency               <20 ng/mL  Insufficiency          20-30 ng/mL  Sufficient             ng/mL  Possible toxicity       >100 ng/mL    The Method used is Siemens Advia Centaur currently standardized to a   Center of Disease Control and Prevention (CDC) certified reference   [address]. Samples containing fluorescein dye can produce falsely   elevated values when tested with the ADVIA Centaur Vitamin D Assay. It is recommended that results in the toxic range, >100 ng/mL, be   retested 72 hours post fluorescein exposure. Follow-up and Dispositions    · Return in about 3 months (around 12/29/2021) for for annual physical, for well women exam.           Depression screen positive, PHQ 9 Score: 14, C-SSRS completed.

## 2021-09-29 NOTE — PROGRESS NOTES
Srinivas Calvin presents today for   Chief Complaint   Patient presents with    Follow-up     Lab review      Visit Vitals  /82 (BP 1 Location: Right arm, BP Patient Position: Sitting, BP Cuff Size: Large adult)   Pulse 98   Temp 98.4 °F (36.9 °C) (Temporal)   Resp 15   Ht 5' 4\" (1.626 m)   Wt 220 lb 12.8 oz (100.2 kg)   SpO2 98%   BMI 37.90 kg/m²     Is someone accompanying this pt? no    Is the patient using any DME equipment during OV? no    Depression Screening:  3 most recent PHQ Screens 9/29/2021   Little interest or pleasure in doing things Several days   Feeling down, depressed, irritable, or hopeless Nearly every day   Total Score PHQ 2 4   Trouble falling or staying asleep, or sleeping too much Nearly every day   Feeling tired or having little energy Nearly every day   Poor appetite, weight loss, or overeating Not at all   Feeling bad about yourself - or that you are a failure or have let yourself or your family down Several days   Trouble concentrating on things such as school, work, reading, or watching TV Nearly every day   Moving or speaking so slowly that other people could have noticed; or the opposite being so fidgety that others notice Not at all   Thoughts of being better off dead, or hurting yourself in some way Not at all   PHQ 9 Score 14   How difficult have these problems made it for you to do your work, take care of your home and get along with others Very difficult       Learning Assessment:  No flowsheet data found. Travel Screening:    Travel Screening     Question   Response    In the last month, have you been in contact with someone who was confirmed or suspected to have Coronavirus / COVID-19? No / Unsure    Have you had a COVID-19 viral test in the last 14 days? No    Do you have any of the following new or worsening symptoms? None of these    Have you traveled internationally or domestically in the last month?   No      Travel History   Travel since 08/29/21     No documented travel since 08/29/21          Health Maintenance reviewed and discussed and ordered per Provider. Health Maintenance Due   Topic Date Due    Eye Exam Retinal or Dilated  Never done    Cervical cancer screen  09/08/2012    DTaP/Tdap/Td series (2 - Td or Tdap) 04/09/2019    Foot Exam Q1  01/16/2020   . Coordination of Care:  1. Have you been to the ER, urgent care clinic since your last visit? Hospitalized since your last visit? no    2. Have you seen or consulted any other health care providers outside of the [address] [place] [address] since your last visit? Include any pap smears or colon screening.  no

## 2022-01-26 ENCOUNTER — TELEPHONE (OUTPATIENT)
Dept: FAMILY MEDICINE CLINIC | Age: 42
End: 2022-01-26

## 2022-01-26 DIAGNOSIS — E11.21 TYPE 2 DIABETES WITH NEPHROPATHY (HCC): ICD-10-CM

## 2022-01-26 DIAGNOSIS — E55.9 HYPOVITAMINOSIS D: ICD-10-CM

## 2022-01-26 DIAGNOSIS — Z00.00 ANNUAL PHYSICAL EXAM: Primary | ICD-10-CM

## 2022-01-26 NOTE — TELEPHONE ENCOUNTER
----- Message from [address] sent at 1/26/2022  1:41 PM EST -----  Subject: Referral Request    QUESTIONS   Reason for referral request? Requesting order for lab work for Annual   physical w/ pap. Has the physician seen you for this condition before? No   Preferred Specialist (if applicable)? Tiara Hurtado  Do you already have an appointment scheduled? Yes  Select Scheduled Date? 2022-02-15  Select Scheduled Physician? Tiara Huizar   Additional Information for Provider?   ---------------------------------------------------------------------------  --------------  Josr CHANG  What is the best way for the office to contact you? OK to leave message on   voicemail  Preferred Call Back Phone Number?  [phone]

## 2022-02-08 ENCOUNTER — HOSPITAL ENCOUNTER (OUTPATIENT)
Dept: LAB | Age: 42
Discharge: HOME | End: 2022-02-08
Payer: COMMERCIAL

## 2022-02-08 ENCOUNTER — APPOINTMENT (OUTPATIENT)
Dept: FAMILY MEDICINE CLINIC | Age: 42
End: 2022-02-08

## 2022-02-08 DIAGNOSIS — Z00.00 ANNUAL PHYSICAL EXAM: ICD-10-CM

## 2022-02-08 DIAGNOSIS — E55.9 HYPOVITAMINOSIS D: ICD-10-CM

## 2022-02-08 DIAGNOSIS — E11.21 TYPE 2 DIABETES WITH NEPHROPATHY (HCC): ICD-10-CM

## 2022-02-08 LAB
25(OH)D3 SERPL-MCNC: 49.2 NG/ML (ref 30–100)
ALBUMIN SERPL-MCNC: 3.8 G/DL (ref 3.4–5)
ALBUMIN/GLOB SERPL: 1.1 {RATIO} (ref 0.8–1.7)
ALP SERPL-CCNC: 109 U/L (ref 45–117)
ALT SERPL-CCNC: 27 U/L (ref 13–56)
ANION GAP SERPL CALC-SCNC: 6 MMOL/L (ref 3–18)
AST SERPL-CCNC: 15 U/L (ref 10–38)
BASOPHILS # BLD: 0 K/UL (ref 0–0.1)
BASOPHILS NFR BLD: 1 % (ref 0–2)
BILIRUB SERPL-MCNC: 0.5 MG/DL (ref 0.2–1)
BUN SERPL-MCNC: 8 MG/DL (ref 7–18)
BUN/CREAT SERPL: 14 (ref 12–20)
CALCIUM SERPL-MCNC: 9.5 MG/DL (ref 8.5–10.1)
CHLORIDE SERPL-SCNC: 101 MMOL/L (ref 100–111)
CHOLEST SERPL-MCNC: 181 MG/DL
CO2 SERPL-SCNC: 31 MMOL/L (ref 21–32)
CREAT SERPL-MCNC: 0.58 MG/DL (ref 0.6–1.3)
DIFFERENTIAL METHOD BLD: ABNORMAL
EOSINOPHIL # BLD: 0.2 K/UL (ref 0–0.4)
EOSINOPHIL NFR BLD: 2 % (ref 0–5)
ERYTHROCYTE [DISTWIDTH] IN BLOOD BY AUTOMATED COUNT: 11.7 % (ref 11.6–14.5)
GLOBULIN SER CALC-MCNC: 3.6 G/DL (ref 2–4)
GLUCOSE SERPL-MCNC: 192 MG/DL (ref 74–99)
HBA1C MFR BLD: 9.4 % (ref 4.2–5.6)
HCT VFR BLD AUTO: 41.1 % (ref 35–45)
HDLC SERPL-MCNC: 41 MG/DL (ref 40–60)
HDLC SERPL: 4.4 {RATIO} (ref 0–5)
HGB BLD-MCNC: 13.6 G/DL (ref 12–16)
IMM GRANULOCYTES # BLD AUTO: 0 K/UL (ref 0–0.04)
IMM GRANULOCYTES NFR BLD AUTO: 0 % (ref 0–0.5)
LDLC SERPL CALC-MCNC: 87.6 MG/DL (ref 0–100)
LIPID PROFILE,FLP: ABNORMAL
LYMPHOCYTES # BLD: 2.9 K/UL (ref 0.9–3.6)
LYMPHOCYTES NFR BLD: 37 % (ref 21–52)
MCH RBC QN AUTO: 29.6 PG (ref 24–34)
MCHC RBC AUTO-ENTMCNC: 33.1 G/DL (ref 31–37)
MCV RBC AUTO: 89.3 FL (ref 78–100)
MONOCYTES # BLD: 0.4 K/UL (ref 0.05–1.2)
MONOCYTES NFR BLD: 6 % (ref 3–10)
NEUTS SEG # BLD: 4.3 K/UL (ref 1.8–8)
NEUTS SEG NFR BLD: 55 % (ref 40–73)
NRBC # BLD: 0 K/UL (ref 0–0.01)
NRBC BLD-RTO: 0 PER 100 WBC
PLATELET # BLD AUTO: 262 K/UL (ref 135–420)
PMV BLD AUTO: 13.1 FL (ref 9.2–11.8)
POTASSIUM SERPL-SCNC: 4.1 MMOL/L (ref 3.5–5.5)
PROT SERPL-MCNC: 7.4 G/DL (ref 6.4–8.2)
RBC # BLD AUTO: 4.6 M/UL (ref 4.2–5.3)
SODIUM SERPL-SCNC: 138 MMOL/L (ref 136–145)
TRIGL SERPL-MCNC: 262 MG/DL (ref ?–150)
VLDLC SERPL CALC-MCNC: 52.4 MG/DL
WBC # BLD AUTO: 7.8 K/UL (ref 4.6–13.2)

## 2022-02-08 PROCEDURE — 36415 COLL VENOUS BLD VENIPUNCTURE: CPT

## 2022-02-08 PROCEDURE — 82306 VITAMIN D 25 HYDROXY: CPT

## 2022-02-08 PROCEDURE — 83036 HEMOGLOBIN GLYCOSYLATED A1C: CPT

## 2022-02-08 PROCEDURE — 80053 COMPREHEN METABOLIC PANEL: CPT

## 2022-02-08 PROCEDURE — 80061 LIPID PANEL: CPT

## 2022-02-08 PROCEDURE — 85025 COMPLETE CBC W/AUTO DIFF WBC: CPT

## 2022-02-15 ENCOUNTER — HOSPITAL ENCOUNTER (OUTPATIENT)
Dept: LAB | Age: 42
Discharge: HOME | End: 2022-02-15
Payer: COMMERCIAL

## 2022-02-15 ENCOUNTER — OFFICE VISIT (OUTPATIENT)
Dept: FAMILY MEDICINE CLINIC | Age: 42
End: 2022-02-15
Payer: COMMERCIAL

## 2022-02-15 VITALS
TEMPERATURE: 97.2 F | RESPIRATION RATE: 15 BRPM | BODY MASS INDEX: 38.28 KG/M2 | SYSTOLIC BLOOD PRESSURE: 136 MMHG | DIASTOLIC BLOOD PRESSURE: 88 MMHG | HEIGHT: 64 IN | WEIGHT: 224.2 LBS | OXYGEN SATURATION: 95 % | HEART RATE: 77 BPM

## 2022-02-15 DIAGNOSIS — E66.01 CLASS 2 SEVERE OBESITY DUE TO EXCESS CALORIES WITH SERIOUS COMORBIDITY AND BODY MASS INDEX (BMI) OF 38.0 TO 38.9 IN ADULT (HCC): ICD-10-CM

## 2022-02-15 DIAGNOSIS — Z12.4 CERVICAL CANCER SCREENING: ICD-10-CM

## 2022-02-15 DIAGNOSIS — Z12.31 ENCOUNTER FOR SCREENING MAMMOGRAM FOR MALIGNANT NEOPLASM OF BREAST: ICD-10-CM

## 2022-02-15 DIAGNOSIS — Z00.00 ANNUAL PHYSICAL EXAM: Primary | ICD-10-CM

## 2022-02-15 DIAGNOSIS — E11.65 UNCONTROLLED TYPE 2 DIABETES MELLITUS WITH HYPERGLYCEMIA (HCC): ICD-10-CM

## 2022-02-15 DIAGNOSIS — I10 ESSENTIAL HYPERTENSION: ICD-10-CM

## 2022-02-15 DIAGNOSIS — E55.9 HYPOVITAMINOSIS D: ICD-10-CM

## 2022-02-15 PROCEDURE — 88175 CYTOPATH C/V AUTO FLUID REDO: CPT

## 2022-02-15 PROCEDURE — 87624 HPV HI-RISK TYP POOLED RSLT: CPT

## 2022-02-15 PROCEDURE — 99396 PREV VISIT EST AGE 40-64: CPT | Performed by: LEGAL MEDICINE

## 2022-02-15 RX ORDER — DULAGLUTIDE 3 MG/.5ML
3 INJECTION, SOLUTION SUBCUTANEOUS
Qty: 13 EACH | Refills: 1 | Status: SHIPPED | OUTPATIENT
Start: 2022-02-15 | End: 2022-03-15 | Stop reason: DRUGHIGH

## 2022-02-15 NOTE — PROGRESS NOTES
Aleksandra Hoangmiguellisa     Chief Complaint   Patient presents with    Physical     w/pap exam     Vitals:    02/15/22 0858   BP: 136/88   Pulse: 77   Resp: 15   Temp: 97.2 °F (36.2 °C)   TempSrc: Temporal   SpO2: 95%   Weight: 224 lb 3.2 oz (101.7 kg)   Height: 5' 4\" (1.626 m)   PainSc:   0 - No pain         HPI:  is here for complete physical examination    Lab result Discussed with patient today ,   Vitamin D is 52   HB a1c is 9.4 up from 8.2    LD L is well controlled  87.6     Last year had eye exam she will schedule exam for this year and request the report to be sent to our office  Past Medical History:   Diagnosis Date    ADHD (attention deficit hyperactivity disorder)     Anxiety state, unspecified     PCOS (polycystic ovarian syndrome)      Past Surgical History:   Procedure Laterality Date    HX  SECTION  ,      Social History     Tobacco Use    Smoking status: Former Smoker     Packs/day: 1.00     Years: 20.00     Pack years: 20.00     Types: Cigarettes    Smokeless tobacco: Never Used   Substance Use Topics    Alcohol use: Yes     Comment: socially       Family History   Problem Relation Age of Onset    Migraines Mother     OSTEOARTHRITIS Mother     Diabetes Father     OSTEOARTHRITIS Father        Review of Systems   Constitutional: Negative for chills, fever, malaise/fatigue and weight loss. HENT: Negative for congestion, ear discharge, ear pain, hearing loss, nosebleeds and sinus pain. Eyes: Negative for blurred vision, double vision and discharge. Respiratory: Negative for cough. Cardiovascular: Negative for chest pain, palpitations, claudication and leg swelling. Gastrointestinal: Negative for abdominal pain, constipation, diarrhea, nausea and vomiting. Genitourinary: Negative for dysuria, frequency and urgency. Musculoskeletal: Negative for back pain, falls, joint pain, myalgias and neck pain. Skin: Negative for itching and rash.    Neurological: Negative for dizziness, tingling, sensory change, speech change, focal weakness, weakness and headaches. Psychiatric/Behavioral: Negative for depression, hallucinations, substance abuse and suicidal ideas. The patient has insomnia. The patient is not nervous/anxious. Physical Exam  Vitals and nursing note reviewed. Constitutional:       General: She is not in acute distress. Appearance: She is well-developed. She is not diaphoretic. HENT:      Head: Normocephalic and atraumatic. Right Ear: Tympanic membrane, ear canal and external ear normal. There is no impacted cerumen. Left Ear: Tympanic membrane, ear canal and external ear normal. There is no impacted cerumen. Nose: No congestion. Eyes:      General: No scleral icterus. Right eye: No discharge. Left eye: No discharge. Conjunctiva/sclera: Conjunctivae normal.      Pupils: Pupils are equal, round, and reactive to light. Neck:      Thyroid: No thyromegaly. Cardiovascular:      Rate and Rhythm: Normal rate and regular rhythm. Heart sounds: Normal heart sounds. No murmur heard. Pulmonary:      Effort: Pulmonary effort is normal. No respiratory distress. Breath sounds: Normal breath sounds. No wheezing or rales. Chest:      Chest wall: No tenderness. Abdominal:      General: There is no distension. Palpations: Abdomen is soft. Tenderness: There is no abdominal tenderness. There is no right CVA tenderness, left CVA tenderness, guarding or rebound. Musculoskeletal:         General: No tenderness or deformity. Normal range of motion. Right lower leg: No edema. Left lower leg: No edema. Lymphadenopathy:      Cervical: No cervical adenopathy. Skin:     General: Skin is warm and dry. Coloration: Skin is not pale. Findings: No erythema or rash. Neurological:      Mental Status: She is alert and oriented to person, place, and time.       Cranial Nerves: No cranial nerve deficit. Coordination: Coordination normal.   Psychiatric:         Mood and Affect: Mood normal.         Behavior: Behavior normal.         Thought Content: Thought content normal.         Judgment: Judgment normal.          Breasts: breasts appear normal, no suspicious masses, no skin or nipple changes or axillary nodes. Normal external genitalia   Normal Vagina no abnormal discharge   Normal cervix   Normal bimanual exam no tenderness no mass   Pap smear done no bleeding no pain she  Tolerated it  it well   Assessment and plan     Plan of care has been discussed with the patient, he agrees to the plan and verbalized understanding. All his questions were answered  More than 50% of the time spent in this visit was counseling the patient about  illness and treatment options         1. Annual physical exam      2. Encounter for screening mammogram for malignant neoplasm of breast    - UMAIR MAMMO BI SCREENING INCL CAD; Future    3. Cervical cancer screening    - PAP IG, APTIMA HPV AND RFX 16/18,45 ([id number]); Future    4. Uncontrolled type 2 diabetes mellitus with hyperglycemia (HCC)   Patien adherent with lifestyle modifications  And also to increase Trulicity dose    - dulaglutide (Trulicity) 3 RH/3.4 mL pnij; 3 mg by SubCUTAneous route every seven (7) days for 90 days. Dispense: 13 Each; Refill: 1    5. Essential hypertension  Blood pressures well controlled    6. Hypovitaminosis D  Patient need to take maintenance dose of 1000 international unit daily of vitamin D over-the-counter    7. obesity with serious comorbidity and body mass index (BMI) of 38.0 to 38.9 in adult  Lifestyle modification has been discussed with patient in details, decrease carbohydrates, decrease or eliminate sugar intake, gradually increase physical activity as tolerated to be about 4 to 5 hours a week.     Current Outpatient Medications   Medication Sig Dispense Refill    dulaglutide (Trulicity) 3 WW/6.2 mL pnij 3 mg by SubCUTAneous route every seven (7) days for 90 days. 13 Each 1    ergocalciferol (ERGOCALCIFEROL) 1,250 mcg (50,000 unit) capsule Take 1 Capsule by mouth every seven (7) days for 90 days. 12 Capsule 1    venlafaxine-SR (EFFEXOR-XR) 150 mg capsule Take 1 Capsule by mouth daily for 90 days. 90 Capsule 1    olmesartan (BENICAR) 20 mg tablet Take 1 Tablet by mouth daily for 90 days. 90 Tablet 1    metFORMIN (GLUCOPHAGE) 500 mg tablet take 1 tablet by mouth twice a day with meals 180 Tab 1    atorvastatin (LIPITOR) 10 mg tablet take 1 tablet by mouth once daily 90 Tab 1    glucose blood VI test strips (OneTouch Verio test strips) strip For once daily use. 100 Strip 3    Insulin Needles, Disposable, 31 gauge x 5/16\" ndle For once daily use. 100 Pen Needle 2       Patient Active Problem List    Diagnosis Date Noted    [facility] 01/16/2019    Dysmenorrhea 01/16/2019    Endometriosis of pelvic peritoneum 01/16/2019    Type 2 diabetes mellitus with complication, without long-term current use of insulin ([facility]) 06/14/2018    PCOS (polycystic ovarian syndrome) 04/16/2015    ADHD (attention deficit hyperactivity disorder)     Anxiety state, unspecified      Results for orders placed or performed during the hospital encounter of 80/51/58   METABOLIC PANEL, COMPREHENSIVE   Result Value Ref Range    Sodium 138 136 - 145 mmol/L    Potassium 4.1 3.5 - 5.5 mmol/L    Chloride 101 100 - 111 mmol/L    CO2 31 21 - 32 mmol/L    Anion gap 6 3.0 - 18 mmol/L    Glucose 192 (H) 74 - 99 mg/dL    BUN 8 7.0 - 18 MG/DL    Creatinine 0.58 (L) 0.6 - 1.3 MG/DL    BUN/Creatinine ratio 14 12 - 20      GFR est AA >60 >60 ml/min/1.73m2    GFR est non-AA >60 >60 ml/min/1.73m2    Calcium 9.5 8.5 - 10.1 MG/DL    Bilirubin, total 0.5 0.2 - 1.0 MG/DL    ALT (SGPT) 27 13 - 56 U/L    AST (SGOT) 15 10 - 38 U/L    Alk.  phosphatase 109 45 - 117 U/L    Protein, total 7.4 6.4 - 8.2 g/dL    Albumin 3.8 3.4 - 5.0 g/dL Globulin 3.6 2.0 - 4.0 g/dL    A-G Ratio 1.1 0.8 - 1.7     LIPID PANEL   Result Value Ref Range    LIPID PROFILE          Cholesterol, total 181 <200 MG/DL    Triglyceride 262 (H) <150 MG/DL    HDL Cholesterol 41 40 - 60 MG/DL    LDL, calculated 87.6 0 - 100 MG/DL    VLDL, calculated 52.4 MG/DL    CHOL/HDL Ratio 4.4 0 - 5.0     HEMOGLOBIN A1C W/O EAG   Result Value Ref Range    Hemoglobin A1c 9.4 (H) 4.2 - 5.6 %   VITAMIN D, 25 HYDROXY   Result Value Ref Range    Vitamin D 25-Hydroxy 49.2 30 - 100 ng/mL   CBC WITH AUTOMATED DIFF   Result Value Ref Range    WBC 7.8 4.6 - 13.2 K/uL    RBC 4.60 4.20 - 5.30 M/uL    HGB 13.6 12.0 - 16.0 g/dL    HCT 41.1 35.0 - 45.0 %    MCV 89.3 78.0 - 100.0 FL    MCH 29.6 24.0 - 34.0 PG    MCHC 33.1 31.0 - 37.0 g/dL    RDW 11.7 11.6 - 14.5 %    PLATELET 770 658 - 876 K/uL    MPV 13.1 (H) 9.2 - 11.8 FL    NRBC 0.0 0  WBC    ABSOLUTE NRBC 0.00 0.00 - 0.01 K/uL    NEUTROPHILS 55 40 - 73 %    LYMPHOCYTES 37 21 - 52 %    MONOCYTES 6 3 - 10 %    EOSINOPHILS 2 0 - 5 %    BASOPHILS 1 0 - 2 %    IMMATURE GRANULOCYTES 0 0.0 - 0.5 %    ABS. NEUTROPHILS 4.3 1.8 - 8.0 K/UL    ABS. LYMPHOCYTES 2.9 0.9 - 3.6 K/UL    ABS. MONOCYTES 0.4 0.05 - 1.2 K/UL    ABS. EOSINOPHILS 0.2 0.0 - 0.4 K/UL    ABS. BASOPHILS 0.0 0.0 - 0.1 K/UL    ABS. IMM.  GRANS. 0.0 0.00 - 0.04 K/UL    [facility] Outpatient Visit on 02/08/2022   Component Date Value Ref Range Status    Sodium 02/08/2022 138  136 - 145 mmol/L Final    Potassium 02/08/2022 4.1  3.5 - 5.5 mmol/L Final    Chloride 02/08/2022 101  100 - 111 mmol/L Final    CO2 02/08/2022 31  21 - 32 mmol/L Final    Anion gap 02/08/2022 6  3.0 - 18 mmol/L Final    Glucose 02/08/2022 192* 74 - 99 mg/dL Final    BUN 02/08/2022 8  7.0 - 18 MG/DL Final    Creatinine 02/08/2022 0.58* 0.6 - 1.3 MG/DL Final    BUN/Creatinine ratio 02/08/2022 14  12 - 20   Final    GFR est AA 02/08/2022 >60  >60 ml/min/1.73m2 Final    GFR est non-AA 02/08/2022 >60 >60 ml/min/1.73m2 Final    Comment: (NOTE)  Estimated GFR is calculated using the Modification of Diet in Renal   Disease (MDRD) Study equation, reported for both  Americans   (GFRAA) and non- Americans (GFRNA), and normalized to 1.73m2   body surface area. The physician must decide which value applies to   the patient. The MDRD study equation should only be used in   individuals age 25 or older. It has not been validated for the   following: pregnant women, patients with serious comorbid conditions,   or on certain medications, or persons with extremes of body size,   muscle mass, or nutritional status.  Calcium 02/08/2022 9.5  8.5 - 10.1 MG/DL Final    Bilirubin, total 02/08/2022 0.5  0.2 - 1.0 MG/DL Final    ALT (SGPT) 02/08/2022 27  13 - 56 U/L Final    AST (SGOT) 02/08/2022 15  10 - 38 U/L Final    Alk. phosphatase 02/08/2022 109  45 - 117 U/L Final    Protein, total 02/08/2022 7.4  6.4 - 8.2 g/dL Final    Albumin 02/08/2022 3.8  3.4 - 5.0 g/dL Final    Globulin 02/08/2022 3.6  2.0 - 4.0 g/dL Final    A-G Ratio 02/08/2022 1.1  0.8 - 1.7   Final    LIPID PROFILE 02/08/2022        Final    Cholesterol, total 02/08/2022 181  <200 MG/DL Final    Triglyceride 02/08/2022 262* <150 MG/DL Final    Comment: The drugs N-acetylcysteine (NAC) and  Metamiszole have been found to cause falsely  low results in this chemical assay. Please  be sure to submit blood samples obtained  BEFORE administration of either of these  drugs to assure correct results.       HDL Cholesterol 02/08/2022 41  40 - 60 MG/DL Final    LDL, calculated 02/08/2022 87.6  0 - 100 MG/DL Final    VLDL, calculated 02/08/2022 52.4  MG/DL Final    CHOL/HDL Ratio 02/08/2022 4.4  0 - 5.0   Final    Hemoglobin A1c 02/08/2022 9.4* 4.2 - 5.6 % Final    Comment: (NOTE)  HbA1C Interpretive Ranges  <5.7              Normal  5.7 - 6.4         Consider Prediabetes  >6.5              Consider Diabetes      Vitamin D 25-Hydroxy 02/08/2022 49.2  30 - 100 ng/mL Final    Comment: (NOTE)  Deficiency               <20 ng/mL  Insufficiency          20-30 ng/mL  Sufficient             ng/mL  Possible toxicity       >100 ng/mL    The Method used is Siemens Advia Centaur currently standardized to a   Center of Disease Control and Prevention (CDC) certified reference   [address]. Samples containing fluorescein dye can produce falsely   elevated values when tested with the ADVIA Centaur Vitamin D Assay. It is recommended that results in the toxic range, >100 ng/mL, be   retested 72 hours post fluorescein exposure.  WBC 02/08/2022 7.8  4.6 - 13.2 K/uL Final    RBC 02/08/2022 4.60  4.20 - 5.30 M/uL Final    HGB 02/08/2022 13.6  12.0 - 16.0 g/dL Final    HCT 02/08/2022 41.1  35.0 - 45.0 % Final    MCV 02/08/2022 89.3  78.0 - 100.0 FL Final    MCH 02/08/2022 29.6  24.0 - 34.0 PG Final    MCHC 02/08/2022 33.1  31.0 - 37.0 g/dL Final    RDW 02/08/2022 11.7  11.6 - 14.5 % Final    PLATELET 82/95/5640 477  135 - 420 K/uL Final    MPV 02/08/2022 13.1* 9.2 - 11.8 FL Final    NRBC 02/08/2022 0.0  0  WBC Final    ABSOLUTE NRBC 02/08/2022 0.00  0.00 - 0.01 K/uL Final    NEUTROPHILS 02/08/2022 55  40 - 73 % Final    LYMPHOCYTES 02/08/2022 37  21 - 52 % Final    MONOCYTES 02/08/2022 6  3 - 10 % Final    EOSINOPHILS 02/08/2022 2  0 - 5 % Final    BASOPHILS 02/08/2022 1  0 - 2 % Final    IMMATURE GRANULOCYTES 02/08/2022 0  0.0 - 0.5 % Final    ABS. NEUTROPHILS 02/08/2022 4.3  1.8 - 8.0 K/UL Final    ABS. LYMPHOCYTES 02/08/2022 2.9  0.9 - 3.6 K/UL Final    ABS. MONOCYTES 02/08/2022 0.4  0.05 - 1.2 K/UL Final    ABS. EOSINOPHILS 02/08/2022 0.2  0.0 - 0.4 K/UL Final    ABS. BASOPHILS 02/08/2022 0.0  0.0 - 0.1 K/UL Final    ABS. IMM. GRANS. 02/08/2022 0.0  0.00 - 0.04 K/UL Final    DF 02/08/2022 AUTOMATED    Final          Follow-up and Dispositions    · Return in about 1 month (around 3/15/2022).

## 2022-02-15 NOTE — PROGRESS NOTES
Elly Phillips is a 42 y.o. female (: 1980) presenting to address:    Chief Complaint   Patient presents with    Physical     w/pap exam       Vitals:    02/15/22 0858   BP: 136/88   Pulse: 77   Resp: 15   Temp: 97.2 °F (36.2 °C)   TempSrc: Temporal   SpO2: 95%   Weight: 224 lb 3.2 oz (101.7 kg)   Height: 5' 4\" (1.626 m)   PainSc:   0 - No pain       Hearing/Vision:   No exam data present    Learning Assessment:   No flowsheet data found. Depression Screening:     3 most recent PHQ Screens 2/15/2022   Little interest or pleasure in doing things Not at all   Feeling down, depressed, irritable, or hopeless Not at all   Total Score PHQ 2 0   Trouble falling or staying asleep, or sleeping too much -   Feeling tired or having little energy -   Poor appetite, weight loss, or overeating -   Feeling bad about yourself - or that you are a failure or have let yourself or your family down -   Trouble concentrating on things such as school, work, reading, or watching TV -   Moving or speaking so slowly that other people could have noticed; or the opposite being so fidgety that others notice -   Thoughts of being better off dead, or hurting yourself in some way -   PHQ 9 Score -   How difficult have these problems made it for you to do your work, take care of your home and get along with others -     Fall Risk Assessment:   No flowsheet data found. Abuse Screening:   No flowsheet data found. ADL Assessment:   No flowsheet data found. Coordination of Care Questionaire:   1. \"Have you been to the ER, urgent care clinic since your last visit? Hospitalized since your last visit? \" No    2. \"Have you seen or consulted any other health care providers outside of the [address] [place] [address] since your last visit? \" No     3. For patients aged 39-70: Has the patient had a colonoscopy / FIT/ Cologuard? NA - based on age    If the patient is female:    4.  For patients aged 41-77: Has the patient had a mammogram within the past 2 years? No  See top three    5. For patients aged 21-65: Has the patient had a pap smear? Yes - no Care Gap present    Advanced Directive:   1. Do you have an Advanced Directive? NO    2. Would you like information on Advanced Directives?  NO

## 2022-03-15 ENCOUNTER — OFFICE VISIT (OUTPATIENT)
Dept: FAMILY MEDICINE CLINIC | Age: 42
End: 2022-03-15
Payer: COMMERCIAL

## 2022-03-15 VITALS
BODY MASS INDEX: 38.41 KG/M2 | HEART RATE: 76 BPM | OXYGEN SATURATION: 97 % | WEIGHT: 225 LBS | RESPIRATION RATE: 16 BRPM | DIASTOLIC BLOOD PRESSURE: 92 MMHG | HEIGHT: 64 IN | SYSTOLIC BLOOD PRESSURE: 142 MMHG | TEMPERATURE: 97.3 F

## 2022-03-15 DIAGNOSIS — E11.65 UNCONTROLLED TYPE 2 DIABETES MELLITUS WITH HYPERGLYCEMIA (HCC): Primary | ICD-10-CM

## 2022-03-15 DIAGNOSIS — F41.8 DEPRESSION WITH ANXIETY: ICD-10-CM

## 2022-03-15 DIAGNOSIS — E11.21 TYPE 2 DIABETES WITH NEPHROPATHY (HCC): ICD-10-CM

## 2022-03-15 PROCEDURE — 3046F HEMOGLOBIN A1C LEVEL >9.0%: CPT | Performed by: LEGAL MEDICINE

## 2022-03-15 PROCEDURE — 99214 OFFICE O/P EST MOD 30 MIN: CPT | Performed by: LEGAL MEDICINE

## 2022-03-15 RX ORDER — METFORMIN HYDROCHLORIDE 1000 MG/1
1000 TABLET ORAL 2 TIMES DAILY WITH MEALS
Qty: 180 TABLET | Refills: 1 | Status: SHIPPED | OUTPATIENT
Start: 2022-03-15 | End: 2022-06-13

## 2022-03-15 RX ORDER — DULAGLUTIDE 4.5 MG/.5ML
4.5 INJECTION, SOLUTION SUBCUTANEOUS
Qty: 4 EACH | Refills: 2 | Status: SHIPPED | OUTPATIENT
Start: 2022-03-15 | End: 2022-04-14

## 2022-03-15 RX ORDER — BLOOD-GLUCOSE METER
EACH MISCELLANEOUS
Qty: 1 EACH | Refills: 0 | Status: SHIPPED | OUTPATIENT
Start: 2022-03-15

## 2022-03-15 RX ORDER — LANCETS
EACH MISCELLANEOUS
Qty: 1 EACH | Refills: 11 | Status: SHIPPED | OUTPATIENT
Start: 2022-03-15

## 2022-03-15 RX ORDER — BLOOD SUGAR DIAGNOSTIC
STRIP MISCELLANEOUS
Qty: 100 STRIP | Refills: 3 | Status: SHIPPED | OUTPATIENT
Start: 2022-03-15

## 2022-03-15 NOTE — PROGRESS NOTES
Cory Pardo     Chief Complaint   Patient presents with    Medication Evaluation     Vitals:    03/15/22 1013 03/15/22 1018   BP: (!) 142/87 (!) 142/92   Pulse: 76    Resp: 16    Temp: 97.3 °F (36.3 °C)    TempSrc: Temporal    SpO2: 97%    Weight: 225 lb (102.1 kg)    Height: 5' 4\" (1.626 m)    PainSc:   0 - No pain          HPI:  Is here for follow up on uncontrolled diabetes she was a started on Trulicity at 3 mg weekly she noticed improvement in her blood sugar readings does not exceed 200    Blood sugar reading it is high in the mornings 195 -113     Past Medical History:   Diagnosis Date    ADHD (attention deficit hyperactivity disorder)     Anxiety state, unspecified     PCOS (polycystic ovarian syndrome)      Past Surgical History:   Procedure Laterality Date    HX  SECTION  ,      Social History     Tobacco Use    Smoking status: Former Smoker     Packs/day: 1.00     Years: 20.00     Pack years: 20.00     Types: Cigarettes    Smokeless tobacco: Never Used   Substance Use Topics    Alcohol use: Yes     Comment: socially       Family History   Problem Relation Age of Onset    Migraines Mother     OSTEOARTHRITIS Mother     Diabetes Father     OSTEOARTHRITIS Father        Review of Systems   Constitutional: Negative for chills, fever, malaise/fatigue and weight loss. HENT: Negative for congestion, ear discharge, ear pain, hearing loss, nosebleeds, sinus pain and sore throat. Eyes: Negative for blurred vision, double vision and discharge. Respiratory: Negative for cough, hemoptysis, sputum production, shortness of breath, wheezing and stridor. Cardiovascular: Negative for chest pain, palpitations, claudication and leg swelling. Gastrointestinal: Negative for abdominal pain, constipation, diarrhea, nausea and vomiting. Genitourinary: Negative for dysuria, flank pain, frequency, hematuria and urgency.    Musculoskeletal: Negative for back pain, falls, joint pain, myalgias and neck pain. Skin: Negative for itching and rash. Neurological: Negative for dizziness, tingling, sensory change, speech change, focal weakness, seizures, loss of consciousness, weakness and headaches. Psychiatric/Behavioral: Positive for depression. Negative for hallucinations, substance abuse and suicidal ideas. The patient is not nervous/anxious. Physical Exam  Vitals and nursing note reviewed. Constitutional:       General: She is not in acute distress. Appearance: She is well-developed. She is not diaphoretic. HENT:      Head: Normocephalic and atraumatic. Mouth/Throat:      Pharynx: No oropharyngeal exudate. Eyes:      General: No scleral icterus. Right eye: No discharge. Left eye: No discharge. Conjunctiva/sclera: Conjunctivae normal.      Pupils: Pupils are equal, round, and reactive to light. Neck:      Thyroid: No thyromegaly. Cardiovascular:      Rate and Rhythm: Normal rate and regular rhythm. Heart sounds: Normal heart sounds. No murmur heard. Pulmonary:      Effort: Pulmonary effort is normal. No respiratory distress. Breath sounds: Normal breath sounds. No wheezing or rales. Chest:      Chest wall: No tenderness. Abdominal:      General: There is no distension. Palpations: Abdomen is soft. Tenderness: There is no abdominal tenderness. There is no rebound. Lymphadenopathy:      Cervical: No cervical adenopathy. Skin:     General: Skin is warm and dry. Coloration: Skin is not pale. Findings: No erythema or rash. Neurological:      Mental Status: She is alert and oriented to person, place, and time. Cranial Nerves: No cranial nerve deficit. Coordination: Coordination normal.   Psychiatric:         Behavior: Behavior normal.         Thought Content:  Thought content normal.         Judgment: Judgment normal.          Assessment and plan     Plan of care has been discussed with the patient, he agrees to the plan and verbalized understanding. All his questions were answered  More than 50% of the time spent in this visit was counseling the patient about  illness and treatment options     1. Type 2 diabetes with nephropathy (HCC)    - metFORMIN (GLUCOPHAGE) 1,000 mg tablet; Take 1 Tablet by mouth two (2) times daily (with meals) for 90 days. Dispense: 180 Tablet; Refill: 1  - dulaglutide (Trulicity) 4.5 IP/6.8 mL pnij; 4.5 mg by SubCUTAneous route every seven (7) days for 30 days. Dispense: 4 Each; Refill: 2  - Blood-Glucose Meter (OneTouch Verio Reflect Meter) misc; Check blood sugar  4 time daily  Dispense: 1 Each; Refill: 0  - lancets misc; Check blood sugar  4 time daily  Dispense: 1 Each; Refill: 11  - glucose blood VI test strips (OneTouch Verio test strips) strip; For once daily use. Dispense: 100 Strip; Refill: 3    2. Uncontrolled type 2 diabetes mellitus with hyperglycemia (HCC)  To increase Trulicity dose to 4.5 mg  - dulaglutide (Trulicity) 4.5 AL/7.9 mL pnij; 4.5 mg by SubCUTAneous route every seven (7) days for 30 days. Dispense: 4 Each; Refill: 2  - Blood-Glucose Meter (OneTouch Verio Reflect Meter) misc; Check blood sugar  4 time daily  Dispense: 1 Each; Refill: 0  - lancets misc; Check blood sugar  4 time daily  Dispense: 1 Each; Refill: 11    3. Depression with anxiety  She is adherent with Effexor regularly   Current Outpatient Medications   Medication Sig Dispense Refill    metFORMIN (GLUCOPHAGE) 1,000 mg tablet Take 1 Tablet by mouth two (2) times daily (with meals) for 90 days. 180 Tablet 1    dulaglutide (Trulicity) 4.5 DN/3.5 mL pnij 4.5 mg by SubCUTAneous route every seven (7) days for 30 days. 4 Each 2    Blood-Glucose Meter (OneTouch Verio Reflect Meter) misc Check blood sugar  4 time daily 1 Each 0    lancets misc Check blood sugar  4 time daily 1 Each 11    glucose blood VI test strips (OneTouch Verio test strips) strip For once daily use.  100 Strip 3    ergocalciferol (ERGOCALCIFEROL) 1,250 mcg (50,000 unit) capsule Take 1 Capsule by mouth every seven (7) days for 90 days. 12 Capsule 1    venlafaxine-SR (EFFEXOR-XR) 150 mg capsule Take 1 Capsule by mouth daily for 90 days. 90 Capsule 1    olmesartan (BENICAR) 20 mg tablet Take 1 Tablet by mouth daily for 90 days. 90 Tablet 1    atorvastatin (LIPITOR) 10 mg tablet take 1 tablet by mouth once daily 90 Tab 1    Insulin Needles, Disposable, 31 gauge x 5/16\" ndle For once daily use. 100 Pen Needle 2       Patient Active Problem List    Diagnosis Date Noted    Type 2 diabetes with nephropathy ([facility] [address] 01/16/2019    Dysmenorrhea 01/16/2019    Endometriosis of pelvic peritoneum 01/16/2019    Type 2 diabetes mellitus with complication, without long-term current use of insulin ([facility]) 06/14/2018    PCOS (polycystic ovarian syndrome) 04/16/2015    ADHD (attention deficit hyperactivity disorder)     Anxiety state, unspecified      Results for orders placed or performed during the hospital encounter of 52/28/33   METABOLIC PANEL, COMPREHENSIVE   Result Value Ref Range    Sodium 138 136 - 145 mmol/L    Potassium 4.1 3.5 - 5.5 mmol/L    Chloride 101 100 - 111 mmol/L    CO2 31 21 - 32 mmol/L    Anion gap 6 3.0 - 18 mmol/L    Glucose 192 (H) 74 - 99 mg/dL    BUN 8 7.0 - 18 MG/DL    Creatinine 0.58 (L) 0.6 - 1.3 MG/DL    BUN/Creatinine ratio 14 12 - 20      GFR est AA >60 >60 ml/min/1.73m2    GFR est non-AA >60 >60 ml/min/1.73m2    Calcium 9.5 8.5 - 10.1 MG/DL    Bilirubin, total 0.5 0.2 - 1.0 MG/DL    ALT (SGPT) 27 13 - 56 U/L    AST (SGOT) 15 10 - 38 U/L    Alk.  phosphatase 109 45 - 117 U/L    Protein, total 7.4 6.4 - 8.2 g/dL    Albumin 3.8 3.4 - 5.0 g/dL    Globulin 3.6 2.0 - 4.0 g/dL    A-G Ratio 1.1 0.8 - 1.7     LIPID PANEL   Result Value Ref Range    LIPID PROFILE          Cholesterol, total 181 <200 MG/DL    Triglyceride 262 (H) <150 MG/DL    HDL Cholesterol 41 40 - 60 MG/DL    LDL, calculated 87.6 0 - 100 MG/DL VLDL, calculated 52.4 MG/DL    CHOL/HDL Ratio 4.4 0 - 5.0     HEMOGLOBIN A1C W/O EAG   Result Value Ref Range    Hemoglobin A1c 9.4 (H) 4.2 - 5.6 %   VITAMIN D, 25 HYDROXY   Result Value Ref Range    Vitamin D 25-Hydroxy 49.2 30 - 100 ng/mL   CBC WITH AUTOMATED DIFF   Result Value Ref Range    WBC 7.8 4.6 - 13.2 K/uL    RBC 4.60 4.20 - 5.30 M/uL    HGB 13.6 12.0 - 16.0 g/dL    HCT 41.1 35.0 - 45.0 %    MCV 89.3 78.0 - 100.0 FL    MCH 29.6 24.0 - 34.0 PG    MCHC 33.1 31.0 - 37.0 g/dL    RDW 11.7 11.6 - 14.5 %    PLATELET 011 648 - 913 K/uL    MPV 13.1 (H) 9.2 - 11.8 FL    NRBC 0.0 0  WBC    ABSOLUTE NRBC 0.00 0.00 - 0.01 K/uL    NEUTROPHILS 55 40 - 73 %    LYMPHOCYTES 37 21 - 52 %    MONOCYTES 6 3 - 10 %    EOSINOPHILS 2 0 - 5 %    BASOPHILS 1 0 - 2 %    IMMATURE GRANULOCYTES 0 0.0 - 0.5 %    ABS. NEUTROPHILS 4.3 1.8 - 8.0 K/UL    ABS. LYMPHOCYTES 2.9 0.9 - 3.6 K/UL    ABS. MONOCYTES 0.4 0.05 - 1.2 K/UL    ABS. EOSINOPHILS 0.2 0.0 - 0.4 K/UL    ABS. BASOPHILS 0.0 0.0 - 0.1 K/UL    ABS. IMM.  GRANS. 0.0 0.00 - 0.04 K/UL    [facility]       Hospital Outpatient Visit on 02/08/2022   Component Date Value Ref Range Status    Sodium 02/08/2022 138  136 - 145 mmol/L Final    Potassium 02/08/2022 4.1  3.5 - 5.5 mmol/L Final    Chloride 02/08/2022 101  100 - 111 mmol/L Final    CO2 02/08/2022 31  21 - 32 mmol/L Final    Anion gap 02/08/2022 6  3.0 - 18 mmol/L Final    Glucose 02/08/2022 192* 74 - 99 mg/dL Final    BUN 02/08/2022 8  7.0 - 18 MG/DL Final    Creatinine 02/08/2022 0.58* 0.6 - 1.3 MG/DL Final    BUN/Creatinine ratio 02/08/2022 14  12 - 20   Final    GFR est AA 02/08/2022 >60  >60 ml/min/1.73m2 Final    GFR est non-AA 02/08/2022 >60  >60 ml/min/1.73m2 Final    Comment: (NOTE)  Estimated GFR is calculated using the Modification of Diet in Renal   Disease (MDRD) Study equation, reported for both  Americans   (GFRAA) and non- Americans (GFRNA), and normalized to 1.73m2   body surface area. The physician must decide which value applies to   the patient. The MDRD study equation should only be used in   individuals age 25 or older. It has not been validated for the   following: pregnant women, patients with serious comorbid conditions,   or on certain medications, or persons with extremes of body size,   muscle mass, or nutritional status.  Calcium 02/08/2022 9.5  8.5 - 10.1 MG/DL Final    Bilirubin, total 02/08/2022 0.5  0.2 - 1.0 MG/DL Final    ALT (SGPT) 02/08/2022 27  13 - 56 U/L Final    AST (SGOT) 02/08/2022 15  10 - 38 U/L Final    Alk. phosphatase 02/08/2022 109  45 - 117 U/L Final    Protein, total 02/08/2022 7.4  6.4 - 8.2 g/dL Final    Albumin 02/08/2022 3.8  3.4 - 5.0 g/dL Final    Globulin 02/08/2022 3.6  2.0 - 4.0 g/dL Final    A-G Ratio 02/08/2022 1.1  0.8 - 1.7   Final    LIPID PROFILE 02/08/2022        Final    Cholesterol, total 02/08/2022 181  <200 MG/DL Final    Triglyceride 02/08/2022 262* <150 MG/DL Final    Comment: The drugs N-acetylcysteine (NAC) and  Metamiszole have been found to cause falsely  low results in this chemical assay. Please  be sure to submit blood samples obtained  BEFORE administration of either of these  drugs to assure correct results.       HDL Cholesterol 02/08/2022 41  40 - 60 MG/DL Final    LDL, calculated 02/08/2022 87.6  0 - 100 MG/DL Final    VLDL, calculated 02/08/2022 52.4  MG/DL Final    CHOL/HDL Ratio 02/08/2022 4.4  0 - 5.0   Final    Hemoglobin A1c 02/08/2022 9.4* 4.2 - 5.6 % Final    Comment: (NOTE)  HbA1C Interpretive Ranges  <5.7              Normal  5.7 - 6.4         Consider Prediabetes  >6.5              Consider Diabetes      Vitamin D 25-Hydroxy 02/08/2022 49.2  30 - 100 ng/mL Final    Comment: (NOTE)  Deficiency               <20 ng/mL  Insufficiency          20-30 ng/mL  Sufficient             ng/mL  Possible toxicity       >100 ng/mL    The Method used is Siemens Advia DGITaur currently standardized to a Center of Disease Control and Prevention (CDC) certified reference   method. Samples containing fluorescein dye can produce falsely   elevated values when tested with the ADVIA Centaur Vitamin D Assay. It is recommended that results in the toxic range, >100 ng/mL, be   retested 72 hours post fluorescein exposure.  WBC 02/08/2022 7.8  4.6 - 13.2 K/uL Final    RBC 02/08/2022 4.60  4.20 - 5.30 M/uL Final    HGB 02/08/2022 13.6  12.0 - 16.0 g/dL Final    HCT 02/08/2022 41.1  35.0 - 45.0 % Final    MCV 02/08/2022 89.3  78.0 - 100.0 FL Final    MCH 02/08/2022 29.6  24.0 - 34.0 PG Final    MCHC 02/08/2022 33.1  31.0 - 37.0 g/dL Final    RDW 02/08/2022 11.7  11.6 - 14.5 % Final    PLATELET 76/06/1237 764  135 - 420 K/uL Final    MPV 02/08/2022 13.1* 9.2 - 11.8 FL Final    NRBC 02/08/2022 0.0  0  WBC Final    ABSOLUTE NRBC 02/08/2022 0.00  0.00 - 0.01 K/uL Final    NEUTROPHILS 02/08/2022 55  40 - 73 % Final    LYMPHOCYTES 02/08/2022 37  21 - 52 % Final    MONOCYTES 02/08/2022 6  3 - 10 % Final    EOSINOPHILS 02/08/2022 2  0 - 5 % Final    BASOPHILS 02/08/2022 1  0 - 2 % Final    IMMATURE GRANULOCYTES 02/08/2022 0  0.0 - 0.5 % Final    ABS. NEUTROPHILS 02/08/2022 4.3  1.8 - 8.0 K/UL Final    ABS. LYMPHOCYTES 02/08/2022 2.9  0.9 - 3.6 K/UL Final    ABS. MONOCYTES 02/08/2022 0.4  0.05 - 1.2 K/UL Final    ABS. EOSINOPHILS 02/08/2022 0.2  0.0 - 0.4 K/UL Final    ABS. BASOPHILS 02/08/2022 0.0  0.0 - 0.1 K/UL Final    ABS. IMM. GRANS. 02/08/2022 0.0  0.00 - 0.04 K/UL Final    DF 02/08/2022 AUTOMATED    Final          Follow-up and Dispositions    · Return in about 7 weeks (around 4/30/2022).

## 2022-03-15 NOTE — PROGRESS NOTES
Pierre Molina is a 42 y.o. female (: 1980) presenting to address:    Chief Complaint   Patient presents with    Medication Evaluation     Diabetic eye exam release faxed over to Dr. Ofelia Chen office to received report to satisfy eye exam in care gap. Vitals:    03/15/22 1013 03/15/22 1018   BP: (!) 142/87 (!) 142/92   Pulse: 76    Resp: 16    Temp: 97.3 °F (36.3 °C)    TempSrc: Temporal    SpO2: 97%    Weight: 225 lb (102.1 kg)    Height: 5' 4\" (1.626 m)    PainSc:   0 - No pain        Hearing/Vision:   No exam data present    Learning Assessment:   No flowsheet data found. Depression Screening:     3 most recent PHQ Screens 3/15/2022   Little interest or pleasure in doing things Nearly every day   Feeling down, depressed, irritable, or hopeless Nearly every day   Total Score PHQ 2 6   Trouble falling or staying asleep, or sleeping too much Nearly every day   Feeling tired or having little energy Nearly every day   Poor appetite, weight loss, or overeating More than half the days   Feeling bad about yourself - or that you are a failure or have let yourself or your family down Several days   Trouble concentrating on things such as school, work, reading, or watching TV Not at all   Moving or speaking so slowly that other people could have noticed; or the opposite being so fidgety that others notice Not at all   Thoughts of being better off dead, or hurting yourself in some way Not at all   PHQ 9 Score 15   How difficult have these problems made it for you to do your work, take care of your home and get along with others Not difficult at all     Fall Risk Assessment:     Fall Risk Assessment, last 12 mths 3/15/2022   Able to walk? Yes   Fall in past 12 months? 0   Do you feel unsteady? 0   Are you worried about falling 0     Abuse Screening:     Abuse Screening Questionnaire 3/15/2022   Do you ever feel afraid of your partner?  N   Are you in a relationship with someone who physically or mentally threatens you? N   Is it safe for you to go home? Y     ADL Assessment:   No flowsheet data found. Coordination of Care Questionaire:   1. \"Have you been to the ER, urgent care clinic since your last visit? Hospitalized since your last visit? \" No    2. \"Have you seen or consulted any other health care providers outside of the [address] [place] [address] since your last visit? \" No     3. For patients aged 39-70: Has the patient had a colonoscopy / FIT/ Cologuard? NA - based on age      If the patient is female:    4. For patients aged 41-77: Has the patient had a mammogram within the past 2 years? Yes - no Care Gap present  See top three    5. For patients aged 21-65: Has the patient had a pap smear? Yes - no Care Gap present    Advanced Directive:   1. Do you have an Advanced Directive? NO    2. Would you like information on Advanced Directives?  NO

## 2022-03-19 DIAGNOSIS — E11.21 TYPE 2 DIABETES WITH NEPHROPATHY (HCC): ICD-10-CM

## 2022-03-19 DIAGNOSIS — E55.9 HYPOVITAMINOSIS D: ICD-10-CM

## 2022-03-19 DIAGNOSIS — F41.8 DEPRESSION WITH ANXIETY: ICD-10-CM

## 2022-03-19 PROBLEM — N80.30 ENDOMETRIOSIS OF PELVIC PERITONEUM: Status: ACTIVE | Noted: 2019-01-16

## 2022-03-19 PROBLEM — N94.6 DYSMENORRHEA: Status: ACTIVE | Noted: 2019-01-16

## 2022-03-19 PROBLEM — E11.8 TYPE 2 DIABETES MELLITUS WITH COMPLICATION, WITHOUT LONG-TERM CURRENT USE OF INSULIN (HCC): Status: ACTIVE | Noted: 2018-06-14

## 2022-03-19 PROCEDURE — 3046F HEMOGLOBIN A1C LEVEL >9.0%: CPT | Performed by: LEGAL MEDICINE

## 2022-03-20 RX ORDER — ERGOCALCIFEROL 1.25 MG/1
CAPSULE ORAL
Qty: 12 CAPSULE | Refills: 1 | Status: SHIPPED | OUTPATIENT
Start: 2022-03-20

## 2022-03-20 RX ORDER — VENLAFAXINE HYDROCHLORIDE 150 MG/1
CAPSULE, EXTENDED RELEASE ORAL
Qty: 90 CAPSULE | Refills: 1 | Status: SHIPPED | OUTPATIENT
Start: 2022-03-20

## 2022-03-20 RX ORDER — OLMESARTAN MEDOXOMIL 20 MG/1
TABLET ORAL
Qty: 90 TABLET | Refills: 1 | Status: SHIPPED | OUTPATIENT
Start: 2022-03-20

## 2023-02-21 ENCOUNTER — HOSPITAL ENCOUNTER (OUTPATIENT)
Facility: HOSPITAL | Age: 43
Setting detail: SPECIMEN
Discharge: HOME | End: 2023-02-24
Payer: COMMERCIAL

## 2023-02-21 ENCOUNTER — OFFICE VISIT (OUTPATIENT)
Dept: FAMILY MEDICINE CLINIC | Facility: CLINIC | Age: 43
End: 2023-02-21
Payer: COMMERCIAL

## 2023-02-21 VITALS
TEMPERATURE: 98 F | RESPIRATION RATE: 16 BRPM | BODY MASS INDEX: 36.19 KG/M2 | DIASTOLIC BLOOD PRESSURE: 94 MMHG | OXYGEN SATURATION: 96 % | SYSTOLIC BLOOD PRESSURE: 142 MMHG | HEIGHT: 64 IN | WEIGHT: 212 LBS | HEART RATE: 76 BPM

## 2023-02-21 DIAGNOSIS — E78.2 MIXED HYPERLIPIDEMIA: ICD-10-CM

## 2023-02-21 DIAGNOSIS — Z00.00 ANNUAL PHYSICAL EXAM: ICD-10-CM

## 2023-02-21 DIAGNOSIS — E11.65 TYPE 2 DIABETES MELLITUS WITH HYPERGLYCEMIA, WITHOUT LONG-TERM CURRENT USE OF INSULIN (HCC): ICD-10-CM

## 2023-02-21 DIAGNOSIS — Z00.00 ANNUAL PHYSICAL EXAM: Primary | ICD-10-CM

## 2023-02-21 DIAGNOSIS — Z12.31 ENCOUNTER FOR SCREENING MAMMOGRAM FOR MALIGNANT NEOPLASM OF BREAST: ICD-10-CM

## 2023-02-21 DIAGNOSIS — Z23 ENCOUNTER FOR IMMUNIZATION: ICD-10-CM

## 2023-02-21 LAB
ALBUMIN SERPL-MCNC: 4 G/DL (ref 3.4–5)
ALBUMIN/GLOB SERPL: 1.1 (ref 0.8–1.7)
ALP SERPL-CCNC: 90 U/L (ref 45–117)
ALT SERPL-CCNC: 18 U/L (ref 13–56)
ANION GAP SERPL CALC-SCNC: 5 MMOL/L (ref 3–18)
AST SERPL-CCNC: 9 U/L (ref 10–38)
BASOPHILS # BLD: 0.1 K/UL (ref 0–0.1)
BASOPHILS NFR BLD: 1 % (ref 0–2)
BILIRUB SERPL-MCNC: 0.5 MG/DL (ref 0.2–1)
BUN SERPL-MCNC: 9 MG/DL (ref 7–18)
BUN/CREAT SERPL: 15 (ref 12–20)
CALCIUM SERPL-MCNC: 9.4 MG/DL (ref 8.5–10.1)
CHLORIDE SERPL-SCNC: 104 MMOL/L (ref 100–111)
CHOLEST SERPL-MCNC: 251 MG/DL
CO2 SERPL-SCNC: 29 MMOL/L (ref 21–32)
CREAT SERPL-MCNC: 0.59 MG/DL (ref 0.6–1.3)
CREAT UR-MCNC: 136 MG/DL (ref 30–125)
DIFFERENTIAL METHOD BLD: ABNORMAL
EOSINOPHIL # BLD: 0.6 K/UL (ref 0–0.4)
EOSINOPHIL NFR BLD: 6 % (ref 0–5)
ERYTHROCYTE [DISTWIDTH] IN BLOOD BY AUTOMATED COUNT: 12.2 % (ref 11.6–14.5)
EST. AVERAGE GLUCOSE BLD GHB EST-MCNC: 131 MG/DL
GLOBULIN SER CALC-MCNC: 3.7 G/DL (ref 2–4)
GLUCOSE SERPL-MCNC: 93 MG/DL (ref 74–99)
HBA1C MFR BLD: 6.2 % (ref 4.2–5.6)
HCT VFR BLD AUTO: 39.9 % (ref 35–45)
HDLC SERPL-MCNC: 45 MG/DL (ref 40–60)
HDLC SERPL: 5.6 (ref 0–5)
HGB BLD-MCNC: 12.9 G/DL (ref 12–16)
IMM GRANULOCYTES # BLD AUTO: 0 K/UL (ref 0–0.04)
IMM GRANULOCYTES NFR BLD AUTO: 0 % (ref 0–0.5)
LDLC SERPL CALC-MCNC: 158.4 MG/DL (ref 0–100)
LIPID PANEL: ABNORMAL
LYMPHOCYTES # BLD: 3.1 K/UL (ref 0.9–3.6)
LYMPHOCYTES NFR BLD: 30 % (ref 21–52)
MCH RBC QN AUTO: 29.7 PG (ref 24–34)
MCHC RBC AUTO-ENTMCNC: 32.3 G/DL (ref 31–37)
MCV RBC AUTO: 91.7 FL (ref 78–100)
MICROALBUMIN UR-MCNC: 1.26 MG/DL (ref 0–3)
MICROALBUMIN/CREAT UR-RTO: 9 MG/G (ref 0–30)
MONOCYTES # BLD: 0.6 K/UL (ref 0.05–1.2)
MONOCYTES NFR BLD: 6 % (ref 3–10)
NEUTS SEG # BLD: 6 K/UL (ref 1.8–8)
NEUTS SEG NFR BLD: 58 % (ref 40–73)
NRBC # BLD: 0 K/UL (ref 0–0.01)
NRBC BLD-RTO: 0 PER 100 WBC
PLATELET # BLD AUTO: 240 K/UL (ref 135–420)
PMV BLD AUTO: 13.5 FL (ref 9.2–11.8)
POTASSIUM SERPL-SCNC: 4 MMOL/L (ref 3.5–5.5)
PROT SERPL-MCNC: 7.7 G/DL (ref 6.4–8.2)
RBC # BLD AUTO: 4.35 M/UL (ref 4.2–5.3)
SODIUM SERPL-SCNC: 138 MMOL/L (ref 136–145)
TRIGL SERPL-MCNC: 238 MG/DL
VLDLC SERPL CALC-MCNC: 47.6 MG/DL
WBC # BLD AUTO: 10.4 K/UL (ref 4.6–13.2)

## 2023-02-21 PROCEDURE — 36415 COLL VENOUS BLD VENIPUNCTURE: CPT

## 2023-02-21 PROCEDURE — 99396 PREV VISIT EST AGE 40-64: CPT | Performed by: LEGAL MEDICINE

## 2023-02-21 PROCEDURE — 82570 ASSAY OF URINE CREATININE: CPT

## 2023-02-21 PROCEDURE — 90677 PCV20 VACCINE IM: CPT | Performed by: LEGAL MEDICINE

## 2023-02-21 PROCEDURE — 90471 IMMUNIZATION ADMIN: CPT | Performed by: LEGAL MEDICINE

## 2023-02-21 PROCEDURE — 80061 LIPID PANEL: CPT

## 2023-02-21 PROCEDURE — 83036 HEMOGLOBIN GLYCOSYLATED A1C: CPT

## 2023-02-21 PROCEDURE — 80053 COMPREHEN METABOLIC PANEL: CPT

## 2023-02-21 PROCEDURE — 85025 COMPLETE CBC W/AUTO DIFF WBC: CPT

## 2023-02-21 ASSESSMENT — ANXIETY QUESTIONNAIRES
3. WORRYING TOO MUCH ABOUT DIFFERENT THINGS: 3
1. FEELING NERVOUS, ANXIOUS, OR ON EDGE: 0
5. BEING SO RESTLESS THAT IT IS HARD TO SIT STILL: 0
6. BECOMING EASILY ANNOYED OR IRRITABLE: 1
GAD7 TOTAL SCORE: 10
IF YOU CHECKED OFF ANY PROBLEMS ON THIS QUESTIONNAIRE, HOW DIFFICULT HAVE THESE PROBLEMS MADE IT FOR YOU TO DO YOUR WORK, TAKE CARE OF THINGS AT HOME, OR GET ALONG WITH OTHER PEOPLE: SOMEWHAT DIFFICULT
2. NOT BEING ABLE TO STOP OR CONTROL WORRYING: 3
4. TROUBLE RELAXING: 3
7. FEELING AFRAID AS IF SOMETHING AWFUL MIGHT HAPPEN: 0

## 2023-02-21 ASSESSMENT — ENCOUNTER SYMPTOMS
WHEEZING: 0
DIARRHEA: 0
CHEST TIGHTNESS: 0
CHOKING: 0
FACIAL SWELLING: 0
STRIDOR: 0
VOICE CHANGE: 0
BLOOD IN STOOL: 0
COUGH: 0
TROUBLE SWALLOWING: 0
EYE REDNESS: 0
APNEA: 0
NAUSEA: 0
EYE PAIN: 0
VOMITING: 0
BACK PAIN: 0
CONSTIPATION: 0
SORE THROAT: 0
ANAL BLEEDING: 0
EYE ITCHING: 0
ABDOMINAL PAIN: 0
SHORTNESS OF BREATH: 0
EYE DISCHARGE: 0

## 2023-02-21 ASSESSMENT — PATIENT HEALTH QUESTIONNAIRE - PHQ9
6. FEELING BAD ABOUT YOURSELF - OR THAT YOU ARE A FAILURE OR HAVE LET YOURSELF OR YOUR FAMILY DOWN: 1
SUM OF ALL RESPONSES TO PHQ QUESTIONS 1-9: 5
SUM OF ALL RESPONSES TO PHQ QUESTIONS 1-9: 5
8. MOVING OR SPEAKING SO SLOWLY THAT OTHER PEOPLE COULD HAVE NOTICED. OR THE OPPOSITE, BEING SO FIGETY OR RESTLESS THAT YOU HAVE BEEN MOVING AROUND A LOT MORE THAN USUAL: 0
4. FEELING TIRED OR HAVING LITTLE ENERGY: 1
SUM OF ALL RESPONSES TO PHQ QUESTIONS 1-9: 5
7. TROUBLE CONCENTRATING ON THINGS, SUCH AS READING THE NEWSPAPER OR WATCHING TELEVISION: 0
1. LITTLE INTEREST OR PLEASURE IN DOING THINGS: 0
10. IF YOU CHECKED OFF ANY PROBLEMS, HOW DIFFICULT HAVE THESE PROBLEMS MADE IT FOR YOU TO DO YOUR WORK, TAKE CARE OF THINGS AT HOME, OR GET ALONG WITH OTHER PEOPLE: 1
9. THOUGHTS THAT YOU WOULD BE BETTER OFF DEAD, OR OF HURTING YOURSELF: 0
2. FEELING DOWN, DEPRESSED OR HOPELESS: 1
SUM OF ALL RESPONSES TO PHQ QUESTIONS 1-9: 5
3. TROUBLE FALLING OR STAYING ASLEEP: 2
5. POOR APPETITE OR OVEREATING: 0
SUM OF ALL RESPONSES TO PHQ9 QUESTIONS 1 & 2: 1

## 2023-02-21 NOTE — PROGRESS NOTES
Noah Woodward     Chief Complaint   Patient presents with    Annual Exam     BP (!) 142/94 (Site: Right Upper Arm, Position: Sitting, Cuff Size: Large Adult)   Pulse 76   Temp 98 °F (36.7 °C) (Temporal)   Resp 16   Ht 5' 4\" (1.626 m)   Wt 212 lb (96.2 kg)   LMP 2023 (Exact Date)   SpO2 96%   BMI 36.39 kg/m²         HPI:  Noah Woodward  she is here for CPE   She has not been seen since 2022  Lost weight following weight watcher she was 240 and got down  to 197 , she stopped weight watcher 10/22 and now  and gained 15 lbs   She has not being on any medication and she stopped all medication including atorvastatin, Trulicity, vitamin D in, metformin, olmesartan and venlafaxine  Blood sugar fasting 75 to 80 and highest was 135    She has changed lifestyle decrease carbohydrate and sugar and she is exercising regularly      Past Medical History:   Diagnosis Date    ADHD (attention deficit hyperactivity disorder)     Anxiety state, unspecified     PCOS (polycystic ovarian syndrome)      Past Surgical History:   Procedure Laterality Date     SECTION  ,      Social History     Tobacco Use    Smoking status: Former     Packs/day: 1.00     Types: Cigarettes    Smokeless tobacco: Never   Substance Use Topics    Alcohol use: Yes       Family History   Problem Relation Age of Onset    Migraines Mother     Osteoarthritis Mother     Diabetes Father     Osteoarthritis Father        Review of Systems   Constitutional:  Negative for activity change, appetite change, chills, diaphoresis, fatigue and fever. HENT:  Negative for congestion, ear discharge, ear pain, facial swelling, hearing loss, sore throat, tinnitus, trouble swallowing and voice change. Eyes:  Negative for pain, discharge, redness and itching. Respiratory:  Negative for apnea, cough, choking, chest tightness, shortness of breath, wheezing and stridor.     Gastrointestinal:  Negative for abdominal pain, anal bleeding, blood in stool, constipation, diarrhea, nausea and vomiting. Endocrine: Negative for cold intolerance and heat intolerance. Genitourinary:  Negative for difficulty urinating, dysuria and flank pain. Musculoskeletal:  Negative for arthralgias, back pain, gait problem, joint swelling, myalgias, neck pain and neck stiffness. Skin:  Negative for rash. Neurological:  Negative for dizziness, light-headedness and headaches. Psychiatric/Behavioral:  Negative for agitation, behavioral problems, confusion, decreased concentration, dysphoric mood, hallucinations, self-injury and suicidal ideas. The patient is not nervous/anxious and is not hyperactive. Physical Exam  Vitals and nursing note reviewed. Constitutional:       General: She is not in acute distress. Appearance: Normal appearance. She is normal weight. She is not ill-appearing, toxic-appearing or diaphoretic. HENT:      Right Ear: Tympanic membrane, ear canal and external ear normal. There is no impacted cerumen. Left Ear: Tympanic membrane, ear canal and external ear normal. There is no impacted cerumen. Nose: No congestion or rhinorrhea. Mouth/Throat:      Pharynx: No oropharyngeal exudate or posterior oropharyngeal erythema. Eyes:      General:         Right eye: No discharge. Left eye: No discharge. Conjunctiva/sclera: Conjunctivae normal.      Pupils: Pupils are equal, round, and reactive to light. Cardiovascular:      Rate and Rhythm: Normal rate and regular rhythm. Pulses:           Dorsalis pedis pulses are 2+ on the right side and 2+ on the left side. Posterior tibial pulses are 2+ on the right side and 2+ on the left side. Pulmonary:      Effort: No respiratory distress. Breath sounds: Normal breath sounds. No stridor. No wheezing or rhonchi. Abdominal:      General: There is no distension. Tenderness: There is no abdominal tenderness.  There is no right CVA tenderness, left CVA tenderness or guarding. Musculoskeletal:         General: No tenderness. Normal range of motion. Cervical back: Normal range of motion and neck supple. No rigidity or tenderness. Right lower leg: No edema. Left lower leg: No edema. Right foot: Normal range of motion. No deformity, bunion, Charcot foot, foot drop or prominent metatarsal heads. Left foot: Normal range of motion. No deformity, bunion, Charcot foot, foot drop or prominent metatarsal heads. Feet:      Right foot:      Protective Sensation: 10 sites tested. 10 sites sensed. Skin integrity: Skin integrity normal. No ulcer, blister, skin breakdown, erythema, warmth, callus, dry skin or fissure. Toenail Condition: Right toenails are normal.      Left foot:      Protective Sensation: 10 sites tested. 10 sites sensed. Skin integrity: Callus present. No ulcer, blister, skin breakdown, erythema, warmth, dry skin or fissure. Toenail Condition: Left toenails are normal.   Skin:     Coloration: Skin is not jaundiced. Findings: No bruising, erythema, lesion or rash. Neurological:      General: No focal deficit present. Mental Status: She is alert and oriented to person, place, and time. Cranial Nerves: No cranial nerve deficit. Sensory: No sensory deficit. Motor: No weakness. Coordination: Coordination normal.   Psychiatric:         Mood and Affect: Mood normal.         Thought Content: Thought content normal.         Judgment: Judgment normal.      Breasts: breasts appear normal, no suspicious masses, no skin or nipple changes or axillary nodes. Assessment and plan     Plan of care has been discussed with the patient, he agrees to the plan and verbalized understanding. All his questions were answered  More than 50% of the time spent in this visit was counseling the patient about  illness and treatment options         ICD-10-CM    1.  Annual physical exam  Z00.00 CBC with Auto Differential     Comprehensive Metabolic Panel     Lipid Panel      2. Type 2 diabetes mellitus with hyperglycemia, without long-term current use of insulin (HCC)  E11.65 Lipid Panel     Hemoglobin A1C     Microalbumin / Creatinine Urine Ratio      3. Encounter for screening mammogram for malignant neoplasm of breast  Z12.31 NEERAJ CLOVIS DIGITAL SCREEN BILATERAL      4. Encounter for immunization  Z23 Pneumococcal, PCV20, PREVNAR 21, (age 25 yrs+), IM, PF   Vaccine was given is no complications  CANCELED: Pneumococcal, PCV-13, PREVNAR 15, (age 10 wks+), IM         Current Outpatient Medications   Medication Sig Dispense Refill    Lancets MISC Check blood sugar  4 time daily (Patient not taking: Reported on 2/21/2023)      atorvastatin (LIPITOR) 10 MG tablet take 1 tablet by mouth once daily (Patient not taking: Reported on 2/21/2023)      Dulaglutide (TRULICITY) 4.5 BC/4.8CV SOPN Inject 4.5 mg into the skin every 7 days (Patient not taking: Reported on 2/21/2023)      ergocalciferol (ERGOCALCIFEROL) 1.25 MG (67269 UT) capsule TAKE 1 CAPSULE BY MOUTH EVERY SEVEN (7) DAYS (Patient not taking: Reported on 2/21/2023)      metFORMIN (GLUCOPHAGE) 1000 MG tablet Take 1,000 mg by mouth 2 times daily (with meals) (Patient not taking: Reported on 2/21/2023)      olmesartan (BENICAR) 20 MG tablet TAKE 1 TABLET BY MOUTH EVERY DAY (Patient not taking: Reported on 2/21/2023)      venlafaxine (EFFEXOR XR) 150 MG extended release capsule TAKE 1 CAPSULE BY MOUTH EVERY DAY (Patient not taking: Reported on 2/21/2023)       No current facility-administered medications for this visit.        Patient Active Problem List    Diagnosis Date Noted    Dysmenorrhea 01/16/2019    Type 2 diabetes with nephropathy ([facility] 01/16/2019    Endometriosis of pelvic peritoneum 01/16/2019    Type 2 diabetes mellitus with complication, without long-term current use of insulin ([facility] 06/14/2018    PCOS (polycystic ovarian syndrome) 04/16/2015    ADHD (attention deficit hyperactivity disorder)      No results found for this visit on 02/21/23.   Hospital Outpatient Visit on 02/21/2023   Component Date Value Ref Range Status    WBC 02/21/2023 10.4  4.6 - 13.2 K/uL Final    RBC 02/21/2023 4.35  4.20 - 5.30 M/uL Final    Hemoglobin 02/21/2023 12.9  12.0 - 16.0 g/dL Final    Hematocrit 02/21/2023 39.9  35.0 - 45.0 % Final    MCV 02/21/2023 91.7  78.0 - 100.0 FL Final    MCH 02/21/2023 29.7  24.0 - 34.0 PG Final    MCHC 02/21/2023 32.3  31.0 - 37.0 g/dL Final    RDW 02/21/2023 12.2  11.6 - 14.5 % Final    Platelets 04/48/9197 240  135 - 420 K/uL Final    MPV 02/21/2023 13.5 (A)  9.2 - 11.8 FL Final    Nucleated RBCs 02/21/2023 0.0  0  WBC Final    nRBC 02/21/2023 0.00  0.00 - 0.01 K/uL Final    Seg Neutrophils 02/21/2023 58  40 - 73 % Final    Lymphocytes 02/21/2023 30  21 - 52 % Final    Monocytes 02/21/2023 6  3 - 10 % Final    Eosinophils % 02/21/2023 6 (A)  0 - 5 % Final    Basophils 02/21/2023 1  0 - 2 % Final    Immature Granulocytes 02/21/2023 0  0.0 - 0.5 % Final    Segs Absolute 02/21/2023 6.0  1.8 - 8.0 K/UL Final    Absolute Lymph # 02/21/2023 3.1  0.9 - 3.6 K/UL Final    Absolute Mono # 02/21/2023 0.6  0.05 - 1.2 K/UL Final    Absolute Eos # 02/21/2023 0.6 (A)  0.0 - 0.4 K/UL Final    Basophils Absolute 02/21/2023 0.1  0.0 - 0.1 K/UL Final    Absolute Immature Granulocyte 02/21/2023 0.0  0.00 - 0.04 K/UL Final    Differential Type 02/21/2023 AUTOMATED    Final    Sodium 02/21/2023 138  136 - 145 mmol/L Final    Potassium 02/21/2023 4.0  3.5 - 5.5 mmol/L Final    Chloride 02/21/2023 104  100 - 111 mmol/L Final    CO2 02/21/2023 29  21 - 32 mmol/L Final    Anion Gap 02/21/2023 5  3.0 - 18 mmol/L Final    Glucose 02/21/2023 93  74 - 99 mg/dL Final    BUN 02/21/2023 9  7.0 - 18 MG/DL Final    Creatinine 02/21/2023 0.59 (A)  0.6 - 1.3 MG/DL Final    Bun/Cre Ratio 02/21/2023 15  12 - 20   Final    Est, Glom Filt Rate 02/21/2023 >60  >60 ml/min/1.73m2 Final    Comment: Pediatric calculator link: Samir.at. org/professionals/kdoqi/gfr_calculatorped       These results are not intended for use in patients <25years of age. eGFR results are calculated without a race factor using  the 2021 CKD-EPI equation. Careful clinical correlation is recommended, particularly when comparing to results calculated using previous equations. The CKD-EPI equation is less accurate in patients with extremes of muscle mass, extra-renal metabolism of creatinine, excessive creatine ingestion, or following therapy that affects renal tubular secretion. Calcium 02/21/2023 9.4  8.5 - 10.1 MG/DL Final    Total Bilirubin 02/21/2023 0.5  0.2 - 1.0 MG/DL Final    ALT 02/21/2023 18  13 - 56 U/L Final    AST 02/21/2023 9 (A)  10 - 38 U/L Final    Alk Phosphatase 02/21/2023 90  45 - 117 U/L Final    Total Protein 02/21/2023 7.7  6.4 - 8.2 g/dL Final    Albumin 02/21/2023 4.0  3.4 - 5.0 g/dL Final    Globulin 02/21/2023 3.7  2.0 - 4.0 g/dL Final    Albumin/Globulin Ratio 02/21/2023 1.1  0.8 - 1.7   Final    LIPID PANEL 02/21/2023        Final    Cholesterol, Total 02/21/2023 251 (A)  <200 MG/DL Final    Triglycerides 02/21/2023 238 (A)  <150 MG/DL Final    Comment: The drugs N-acetylcysteine (NAC) and  Metamiszole have been found to cause falsely  low results in this chemical assay. Please  be sure to submit blood samples obtained  BEFORE administration of either of these  drugs to assure correct results. HDL 02/21/2023 45  40 - 60 MG/DL Final    LDL Calculated 02/21/2023 158.4 (A)  0 - 100 MG/DL Final    VLDL Cholesterol Calculated 02/21/2023 47.6  MG/DL Final    Chol/HDL Ratio 02/21/2023 5.6 (A)  0 - 5.0   Final    Microalbumin, Random Urine 02/21/2023 PENDING  MG/DL Incomplete    Creatinine, Ur 02/21/2023 136.00 (A)  30 - 125 mg/dL Final    Microalbumin Creatinine Ratio 02/21/2023 PENDING  mg/g Incomplete          No follow-ups on file.

## 2023-02-21 NOTE — PROGRESS NOTES
Phillip Mcintyre is a 36 y.o. female (: 1980) presenting to address:    Chief Complaint   Patient presents with    Annual Exam       Vitals:    23 1049   BP: (!) 142/94   Pulse: 76   Resp: 16   Temp: 98 °F (36.7 °C)   SpO2: 96%       Coordination of Care Questionaire:   1. \"Have you been to the ER, urgent care clinic since your last visit? Hospitalized since your last visit? \" No    2. \"Have you seen or consulted any other health care providers outside of the [address] [place] [address] since your last visit? \" No     3. For patients aged 39-70: Has the patient had a colonoscopy / FIT/ Cologuard? NA - based on age      If the patient is female:    4. For patients aged 41-77: Has the patient had a mammogram within the past 2 years? No      5. For patients aged 21-65: Has the patient had a pap smear? Yes - Care Gap present. Most recent result on file    Advanced Directive:   1. Do you have an Advanced Directive? No    2. Would you like information on Advanced Directives?  No    Immunizations Administered       Name Date Dose Route    Pneumococcal conjugate PCV20, PF (Prevnar 20) 2023 0.5 mL Intramuscular    Site: Deltoid- Left    Lot: [id number]    NDC: 6995-0147-33

## 2023-02-24 RX ORDER — ATORVASTATIN CALCIUM 20 MG/1
20 TABLET, FILM COATED ORAL DAILY
Qty: 90 TABLET | Refills: 3 | Status: SHIPPED | OUTPATIENT
Start: 2023-02-24 | End: 2023-05-25

## 2023-03-28 ENCOUNTER — HOSPITAL ENCOUNTER (OUTPATIENT)
Dept: WOMENS IMAGING | Facility: HOSPITAL | Age: 43
Discharge: HOME | End: 2023-03-31
Payer: COMMERCIAL

## 2023-03-28 DIAGNOSIS — Z12.31 ENCOUNTER FOR SCREENING MAMMOGRAM FOR MALIGNANT NEOPLASM OF BREAST: ICD-10-CM

## 2023-03-28 PROCEDURE — 77063 BREAST TOMOSYNTHESIS BI: CPT

## 2023-07-24 SDOH — ECONOMIC STABILITY: HOUSING INSECURITY
IN THE LAST 12 MONTHS, WAS THERE A TIME WHEN YOU DID NOT HAVE A STEADY PLACE TO SLEEP OR SLEPT IN A SHELTER (INCLUDING NOW)?: PATIENT REFUSED

## 2023-07-24 SDOH — ECONOMIC STABILITY: FOOD INSECURITY: WITHIN THE PAST 12 MONTHS, YOU WORRIED THAT YOUR FOOD WOULD RUN OUT BEFORE YOU GOT MONEY TO BUY MORE.: PATIENT DECLINED

## 2023-07-24 SDOH — ECONOMIC STABILITY: INCOME INSECURITY: HOW HARD IS IT FOR YOU TO PAY FOR THE VERY BASICS LIKE FOOD, HOUSING, MEDICAL CARE, AND HEATING?: PATIENT DECLINED

## 2023-07-24 SDOH — ECONOMIC STABILITY: TRANSPORTATION INSECURITY
IN THE PAST 12 MONTHS, HAS LACK OF TRANSPORTATION KEPT YOU FROM MEETINGS, WORK, OR FROM GETTING THINGS NEEDED FOR DAILY LIVING?: PATIENT DECLINED

## 2023-07-24 SDOH — ECONOMIC STABILITY: FOOD INSECURITY: WITHIN THE PAST 12 MONTHS, THE FOOD YOU BOUGHT JUST DIDN'T LAST AND YOU DIDN'T HAVE MONEY TO GET MORE.: PATIENT DECLINED

## 2023-07-25 ENCOUNTER — HOSPITAL ENCOUNTER (OUTPATIENT)
Facility: HOSPITAL | Age: 43
Setting detail: SPECIMEN
Discharge: HOME | End: 2023-07-28
Payer: COMMERCIAL

## 2023-07-25 ENCOUNTER — OFFICE VISIT (OUTPATIENT)
Dept: FAMILY MEDICINE CLINIC | Facility: CLINIC | Age: 43
End: 2023-07-25
Payer: COMMERCIAL

## 2023-07-25 VITALS
BODY MASS INDEX: 36.02 KG/M2 | SYSTOLIC BLOOD PRESSURE: 130 MMHG | OXYGEN SATURATION: 96 % | DIASTOLIC BLOOD PRESSURE: 80 MMHG | HEART RATE: 82 BPM | RESPIRATION RATE: 15 BRPM | HEIGHT: 64 IN | TEMPERATURE: 98 F | WEIGHT: 211 LBS

## 2023-07-25 DIAGNOSIS — E78.2 MIXED HYPERLIPIDEMIA: ICD-10-CM

## 2023-07-25 DIAGNOSIS — I10 ESSENTIAL HYPERTENSION: ICD-10-CM

## 2023-07-25 DIAGNOSIS — E66.01 CLASS 2 SEVERE OBESITY DUE TO EXCESS CALORIES WITH SERIOUS COMORBIDITY AND BODY MASS INDEX (BMI) OF 36.0 TO 36.9 IN ADULT (HCC): ICD-10-CM

## 2023-07-25 DIAGNOSIS — F41.1 GAD (GENERALIZED ANXIETY DISORDER): ICD-10-CM

## 2023-07-25 DIAGNOSIS — E11.65 TYPE 2 DIABETES MELLITUS WITH HYPERGLYCEMIA, WITHOUT LONG-TERM CURRENT USE OF INSULIN (HCC): Primary | ICD-10-CM

## 2023-07-25 DIAGNOSIS — F32.89 OTHER DEPRESSION: ICD-10-CM

## 2023-07-25 DIAGNOSIS — E11.65 TYPE 2 DIABETES MELLITUS WITH HYPERGLYCEMIA, WITHOUT LONG-TERM CURRENT USE OF INSULIN (HCC): ICD-10-CM

## 2023-07-25 LAB
ALBUMIN SERPL-MCNC: 3.9 G/DL (ref 3.4–5)
ALBUMIN/GLOB SERPL: 1.1 (ref 0.8–1.7)
ALP SERPL-CCNC: 98 U/L (ref 45–117)
ALT SERPL-CCNC: 18 U/L (ref 13–56)
ANION GAP SERPL CALC-SCNC: 4 MMOL/L (ref 3–18)
AST SERPL-CCNC: 10 U/L (ref 10–38)
BILIRUB SERPL-MCNC: 0.4 MG/DL (ref 0.2–1)
BUN SERPL-MCNC: 8 MG/DL (ref 7–18)
BUN/CREAT SERPL: 11 (ref 12–20)
CALCIUM SERPL-MCNC: 9.2 MG/DL (ref 8.5–10.1)
CHLORIDE SERPL-SCNC: 107 MMOL/L (ref 100–111)
CHOLEST SERPL-MCNC: 207 MG/DL
CO2 SERPL-SCNC: 27 MMOL/L (ref 21–32)
CREAT SERPL-MCNC: 0.72 MG/DL (ref 0.6–1.3)
EST. AVERAGE GLUCOSE BLD GHB EST-MCNC: 134 MG/DL
GLOBULIN SER CALC-MCNC: 3.7 G/DL (ref 2–4)
GLUCOSE SERPL-MCNC: 118 MG/DL (ref 74–99)
HBA1C MFR BLD: 6.3 % (ref 4.2–5.6)
HDLC SERPL-MCNC: 36 MG/DL (ref 40–60)
HDLC SERPL: 5.8 (ref 0–5)
LDLC SERPL CALC-MCNC: 122.8 MG/DL (ref 0–100)
LIPID PANEL: ABNORMAL
POTASSIUM SERPL-SCNC: 4 MMOL/L (ref 3.5–5.5)
PROT SERPL-MCNC: 7.6 G/DL (ref 6.4–8.2)
SODIUM SERPL-SCNC: 138 MMOL/L (ref 136–145)
TRIGL SERPL-MCNC: 241 MG/DL
VLDLC SERPL CALC-MCNC: 48.2 MG/DL

## 2023-07-25 PROCEDURE — 80053 COMPREHEN METABOLIC PANEL: CPT

## 2023-07-25 PROCEDURE — 3044F HG A1C LEVEL LT 7.0%: CPT | Performed by: LEGAL MEDICINE

## 2023-07-25 PROCEDURE — 83036 HEMOGLOBIN GLYCOSYLATED A1C: CPT

## 2023-07-25 PROCEDURE — 3075F SYST BP GE 130 - 139MM HG: CPT | Performed by: LEGAL MEDICINE

## 2023-07-25 PROCEDURE — 36415 COLL VENOUS BLD VENIPUNCTURE: CPT

## 2023-07-25 PROCEDURE — 99214 OFFICE O/P EST MOD 30 MIN: CPT | Performed by: LEGAL MEDICINE

## 2023-07-25 PROCEDURE — 80061 LIPID PANEL: CPT

## 2023-07-25 PROCEDURE — 3079F DIAST BP 80-89 MM HG: CPT | Performed by: LEGAL MEDICINE

## 2023-07-25 RX ORDER — OLMESARTAN MEDOXOMIL 20 MG/1
20 TABLET ORAL DAILY
Qty: 90 TABLET | Refills: 1 | Status: SHIPPED | OUTPATIENT
Start: 2023-07-25 | End: 2024-01-21

## 2023-07-25 RX ORDER — SEMAGLUTIDE 1.34 MG/ML
0.5 INJECTION, SOLUTION SUBCUTANEOUS WEEKLY
Qty: 1 ADJUSTABLE DOSE PRE-FILLED PEN SYRINGE | Refills: 1 | Status: SHIPPED | OUTPATIENT
Start: 2023-07-25 | End: 2023-08-16

## 2023-07-25 ASSESSMENT — ENCOUNTER SYMPTOMS
EYE ITCHING: 0
EYE REDNESS: 0
EYE PAIN: 0
EYE DISCHARGE: 0
SINUS PAIN: 0
SORE THROAT: 0
SHORTNESS OF BREATH: 0
SINUS PRESSURE: 0
DIARRHEA: 0
APNEA: 0
CHEST TIGHTNESS: 0
ABDOMINAL PAIN: 0
BLOOD IN STOOL: 0
VOICE CHANGE: 0
CONSTIPATION: 0
WHEEZING: 0
CHOKING: 0
COUGH: 0
FACIAL SWELLING: 0
ANAL BLEEDING: 0
VOMITING: 0
BACK PAIN: 0
TROUBLE SWALLOWING: 0
RHINORRHEA: 0
NAUSEA: 0

## 2023-07-28 RX ORDER — SEMAGLUTIDE 0.25 MG/.5ML
0.25 INJECTION, SOLUTION SUBCUTANEOUS
Qty: 2 ML | Refills: 0 | Status: SHIPPED | OUTPATIENT
Start: 2023-07-28 | End: 2023-08-27

## 2023-08-02 ENCOUNTER — TELEPHONE (OUTPATIENT)
Dept: FAMILY MEDICINE CLINIC | Facility: CLINIC | Age: 43
End: 2023-08-02

## 2023-08-02 DIAGNOSIS — E11.65 TYPE 2 DIABETES MELLITUS WITH HYPERGLYCEMIA, WITHOUT LONG-TERM CURRENT USE OF INSULIN (HCC): Primary | ICD-10-CM

## 2023-08-02 NOTE — TELEPHONE ENCOUNTER
LM for pt, Mounjaro or Ozempic are not covered, does pt want to try Trulicity or contact insurance to see what is in network?

## 2023-08-03 NOTE — TELEPHONE ENCOUNTER
Informed pt Mounjaro and Ozempic are not covered; pt voiced understanding and agrees to trying Trulicity, please send Rx to pharmacy.

## 2024-04-03 SDOH — HEALTH STABILITY: PHYSICAL HEALTH: ON AVERAGE, HOW MANY DAYS PER WEEK DO YOU ENGAGE IN MODERATE TO STRENUOUS EXERCISE (LIKE A BRISK WALK)?: 4 DAYS

## 2024-04-03 SDOH — HEALTH STABILITY: PHYSICAL HEALTH: ON AVERAGE, HOW MANY MINUTES DO YOU ENGAGE IN EXERCISE AT THIS LEVEL?: 40 MIN

## 2024-04-04 ENCOUNTER — HOSPITAL ENCOUNTER (OUTPATIENT)
Facility: HOSPITAL | Age: 44
Setting detail: SPECIMEN
Discharge: HOME | End: 2024-04-04
Payer: COMMERCIAL

## 2024-04-04 ENCOUNTER — OFFICE VISIT (OUTPATIENT)
Facility: CLINIC | Age: 44
End: 2024-04-04
Payer: COMMERCIAL

## 2024-04-04 VITALS
RESPIRATION RATE: 16 BRPM | OXYGEN SATURATION: 95 % | DIASTOLIC BLOOD PRESSURE: 90 MMHG | TEMPERATURE: 98.4 F | HEIGHT: 64 IN | SYSTOLIC BLOOD PRESSURE: 134 MMHG | WEIGHT: 222.8 LBS | HEART RATE: 69 BPM | BODY MASS INDEX: 38.04 KG/M2

## 2024-04-04 DIAGNOSIS — Z11.4 SCREENING FOR HIV (HUMAN IMMUNODEFICIENCY VIRUS): ICD-10-CM

## 2024-04-04 DIAGNOSIS — E11.21 TYPE 2 DIABETES WITH NEPHROPATHY (HCC): Primary | ICD-10-CM

## 2024-04-04 DIAGNOSIS — E11.21 TYPE 2 DIABETES WITH NEPHROPATHY (HCC): ICD-10-CM

## 2024-04-04 DIAGNOSIS — E55.9 VITAMIN D DEFICIENCY: ICD-10-CM

## 2024-04-04 DIAGNOSIS — I10 ESSENTIAL HYPERTENSION: ICD-10-CM

## 2024-04-04 DIAGNOSIS — Z12.31 ENCOUNTER FOR SCREENING MAMMOGRAM FOR MALIGNANT NEOPLASM OF BREAST: ICD-10-CM

## 2024-04-04 DIAGNOSIS — E78.2 MIXED HYPERLIPIDEMIA: ICD-10-CM

## 2024-04-04 DIAGNOSIS — Z76.89 ENCOUNTER TO ESTABLISH CARE: ICD-10-CM

## 2024-04-04 LAB
25(OH)D3 SERPL-MCNC: 14.6 NG/ML (ref 30–100)
ALBUMIN SERPL-MCNC: 3.9 G/DL (ref 3.4–5)
ALBUMIN/GLOB SERPL: 1.1 (ref 0.8–1.7)
ALP SERPL-CCNC: 89 U/L (ref 45–117)
ALT SERPL-CCNC: 20 U/L (ref 13–56)
ANION GAP SERPL CALC-SCNC: 3 MMOL/L (ref 3–18)
AST SERPL-CCNC: 10 U/L (ref 10–38)
BILIRUB SERPL-MCNC: 0.5 MG/DL (ref 0.2–1)
BUN SERPL-MCNC: 10 MG/DL (ref 7–18)
BUN/CREAT SERPL: 17 (ref 12–20)
CALCIUM SERPL-MCNC: 9.1 MG/DL (ref 8.5–10.1)
CHLORIDE SERPL-SCNC: 105 MMOL/L (ref 100–111)
CHOLEST SERPL-MCNC: 229 MG/DL
CO2 SERPL-SCNC: 29 MMOL/L (ref 21–32)
CREAT SERPL-MCNC: 0.6 MG/DL (ref 0.6–1.3)
CREAT UR-MCNC: 176 MG/DL (ref 30–125)
ERYTHROCYTE [DISTWIDTH] IN BLOOD BY AUTOMATED COUNT: 12.3 % (ref 11.6–14.5)
GLOBULIN SER CALC-MCNC: 3.7 G/DL (ref 2–4)
GLUCOSE SERPL-MCNC: 148 MG/DL (ref 74–99)
HBA1C MFR BLD: 7 %
HCT VFR BLD AUTO: 41 % (ref 35–45)
HDLC SERPL-MCNC: 42 MG/DL (ref 40–60)
HDLC SERPL: 5.5 (ref 0–5)
HGB BLD-MCNC: 13.5 G/DL (ref 12–16)
LDLC SERPL CALC-MCNC: 137 MG/DL (ref 0–100)
LIPID PANEL: ABNORMAL
MCH RBC QN AUTO: 30 PG (ref 24–34)
MCHC RBC AUTO-ENTMCNC: 32.9 G/DL (ref 31–37)
MCV RBC AUTO: 91.1 FL (ref 78–100)
MICROALBUMIN UR-MCNC: 1.34 MG/DL (ref 0–3)
MICROALBUMIN/CREAT UR-RTO: 8 MG/G (ref 0–30)
NRBC # BLD: 0 K/UL (ref 0–0.01)
NRBC BLD-RTO: 0 PER 100 WBC
PLATELET # BLD AUTO: 231 K/UL (ref 135–420)
PMV BLD AUTO: 12.9 FL (ref 9.2–11.8)
POTASSIUM SERPL-SCNC: 4.2 MMOL/L (ref 3.5–5.5)
PROT SERPL-MCNC: 7.6 G/DL (ref 6.4–8.2)
RBC # BLD AUTO: 4.5 M/UL (ref 4.2–5.3)
SODIUM SERPL-SCNC: 137 MMOL/L (ref 136–145)
TRIGL SERPL-MCNC: 250 MG/DL
VLDLC SERPL CALC-MCNC: 50 MG/DL
WBC # BLD AUTO: 9.2 K/UL (ref 4.6–13.2)

## 2024-04-04 PROCEDURE — 80061 LIPID PANEL: CPT

## 2024-04-04 PROCEDURE — 99214 OFFICE O/P EST MOD 30 MIN: CPT | Performed by: NURSE PRACTITIONER

## 2024-04-04 PROCEDURE — 3075F SYST BP GE 130 - 139MM HG: CPT | Performed by: NURSE PRACTITIONER

## 2024-04-04 PROCEDURE — 82043 UR ALBUMIN QUANTITATIVE: CPT

## 2024-04-04 PROCEDURE — 3080F DIAST BP >= 90 MM HG: CPT | Performed by: NURSE PRACTITIONER

## 2024-04-04 PROCEDURE — 82306 VITAMIN D 25 HYDROXY: CPT

## 2024-04-04 PROCEDURE — 80053 COMPREHEN METABOLIC PANEL: CPT

## 2024-04-04 PROCEDURE — 36415 COLL VENOUS BLD VENIPUNCTURE: CPT

## 2024-04-04 PROCEDURE — 85027 COMPLETE CBC AUTOMATED: CPT

## 2024-04-04 PROCEDURE — 83036 HEMOGLOBIN GLYCOSYLATED A1C: CPT | Performed by: NURSE PRACTITIONER

## 2024-04-04 PROCEDURE — 82570 ASSAY OF URINE CREATININE: CPT

## 2024-04-04 PROCEDURE — 87389 HIV-1 AG W/HIV-1&-2 AB AG IA: CPT

## 2024-04-04 RX ORDER — PIOGLITAZONEHYDROCHLORIDE 15 MG/1
15 TABLET ORAL DAILY
Qty: 90 TABLET | Refills: 1 | Status: SHIPPED | OUTPATIENT
Start: 2024-04-04

## 2024-04-04 RX ORDER — ATORVASTATIN CALCIUM 20 MG/1
20 TABLET, FILM COATED ORAL DAILY
Qty: 90 TABLET | Refills: 1 | Status: SHIPPED | OUTPATIENT
Start: 2024-04-04

## 2024-04-04 RX ORDER — OLMESARTAN MEDOXOMIL 20 MG/1
20 TABLET ORAL DAILY
Qty: 90 TABLET | Refills: 1 | Status: SHIPPED | OUTPATIENT
Start: 2024-04-04 | End: 2024-10-01

## 2024-04-04 ASSESSMENT — PATIENT HEALTH QUESTIONNAIRE - PHQ9
SUM OF ALL RESPONSES TO PHQ QUESTIONS 1-9: 9
3. TROUBLE FALLING OR STAYING ASLEEP: SEVERAL DAYS
4. FEELING TIRED OR HAVING LITTLE ENERGY: NOT AT ALL
SUM OF ALL RESPONSES TO PHQ9 QUESTIONS 1 & 2: 1
SUM OF ALL RESPONSES TO PHQ QUESTIONS 1-9: 9
7. TROUBLE CONCENTRATING ON THINGS, SUCH AS READING THE NEWSPAPER OR WATCHING TELEVISION: NEARLY EVERY DAY
10. IF YOU CHECKED OFF ANY PROBLEMS, HOW DIFFICULT HAVE THESE PROBLEMS MADE IT FOR YOU TO DO YOUR WORK, TAKE CARE OF THINGS AT HOME, OR GET ALONG WITH OTHER PEOPLE: SOMEWHAT DIFFICULT
6. FEELING BAD ABOUT YOURSELF - OR THAT YOU ARE A FAILURE OR HAVE LET YOURSELF OR YOUR FAMILY DOWN: NEARLY EVERY DAY
8. MOVING OR SPEAKING SO SLOWLY THAT OTHER PEOPLE COULD HAVE NOTICED. OR THE OPPOSITE, BEING SO FIGETY OR RESTLESS THAT YOU HAVE BEEN MOVING AROUND A LOT MORE THAN USUAL: NOT AT ALL
9. THOUGHTS THAT YOU WOULD BE BETTER OFF DEAD, OR OF HURTING YOURSELF: NOT AT ALL
5. POOR APPETITE OR OVEREATING: SEVERAL DAYS
SUM OF ALL RESPONSES TO PHQ QUESTIONS 1-9: 9
SUM OF ALL RESPONSES TO PHQ QUESTIONS 1-9: 9
2. FEELING DOWN, DEPRESSED OR HOPELESS: NOT AT ALL
1. LITTLE INTEREST OR PLEASURE IN DOING THINGS: SEVERAL DAYS

## 2024-04-04 NOTE — PROGRESS NOTES
[address] [place] [address]               [phone]      Karrie Hummel is a 43 y.o. female and presents with New Patient and Annual Exam       Assessment/Plan:    1. Type 2 diabetes with nephropathy (HCC)  -     Microalbumin / Creatinine Urine Ratio; Future  -     pioglitazone (ACTOS) 15 MG tablet; Take 1 tablet by mouth daily, Disp-90 tablet, R-1Normal  -     AMB POC HEMOGLOBIN A1C  -     Hemoglobin A1C; Future  2. Mixed hyperlipidemia  -     atorvastatin (LIPITOR) 20 MG tablet; Take 1 tablet by mouth daily, Disp-90 tablet, R-1Normal  -     Lipid Panel; Future  -     Lipid Panel; Future  3. Essential hypertension  -     Comprehensive Metabolic Panel; Future  -     CBC; Future  -     olmesartan (BENICAR) 20 MG tablet; Take 1 tablet by mouth daily, Disp-90 tablet, R-1Normal  -     CBC; Future  -     Comprehensive Metabolic Panel; Future  4. Encounter for screening mammogram for malignant neoplasm of breast  -     NEERAJ CLOVIS DIGITAL SCREEN BILATERAL; Future  5. Vitamin D deficiency  -     Vitamin D 25 Hydroxy; Future  6. Screening for HIV (human immunodeficiency virus)  -     HIV 1/2 Ag/Ab, 4TH Generation,W Rflx Confirm; Future  7. Encounter to establish care     Visit today to establish care, prior patient of Eliot Lindsay within the past 3 years  POC A1c 7%, start actos 15mg daily  Labs today and prior to next visit  Refills provided  Health maintenance addressed  Patient verbalized understanding and is in agreement with this plan of care    Follow up and disposition:   Return in about 3 months (around 7/4/2024) for Physical, w/ogyn, DM, HLD, HTN, 30min, office, w/labsprior.      Subjective:    Labs obtained prior to visit? No  Reviewed with patient? N/A    Visit today to establish care, prior patient of eliot cho    DMII-   Patient reports medication compliance  currently not on medication   Diabetic diet compliance frequently  Patient monitors blood sugars regularly daily

## 2024-04-04 NOTE — PATIENT INSTRUCTIONS
Check blood glucose once a day either:   First thing in the morning    Or   2 hours after a meal    Or   At bedtime

## 2024-04-05 LAB
HIV 1+2 AB+HIV1 P24 AG SERPL QL IA: NONREACTIVE
HIV 1/2 RESULT COMMENT: NORMAL

## 2024-04-09 ENCOUNTER — HOSPITAL ENCOUNTER (OUTPATIENT)
Dept: WOMENS IMAGING | Facility: HOSPITAL | Age: 44
Discharge: HOME | End: 2024-04-12
Payer: COMMERCIAL

## 2024-04-09 DIAGNOSIS — Z12.31 ENCOUNTER FOR SCREENING MAMMOGRAM FOR MALIGNANT NEOPLASM OF BREAST: ICD-10-CM

## 2024-04-09 PROCEDURE — 77063 BREAST TOMOSYNTHESIS BI: CPT

## 2024-04-22 DIAGNOSIS — E55.9 VITAMIN D DEFICIENCY: Primary | ICD-10-CM

## 2024-04-22 RX ORDER — CHOLECALCIFEROL (VITAMIN D3) 1250 MCG
1 CAPSULE ORAL
Qty: 12 CAPSULE | Refills: 1 | Status: SHIPPED | OUTPATIENT
Start: 2024-04-22

## 2024-04-30 ENCOUNTER — OFFICE VISIT (OUTPATIENT)
Facility: CLINIC | Age: 44
End: 2024-04-30
Payer: COMMERCIAL

## 2024-04-30 VITALS
SYSTOLIC BLOOD PRESSURE: 143 MMHG | OXYGEN SATURATION: 94 % | HEART RATE: 66 BPM | HEIGHT: 64 IN | WEIGHT: 223 LBS | BODY MASS INDEX: 38.07 KG/M2 | TEMPERATURE: 98.5 F | DIASTOLIC BLOOD PRESSURE: 87 MMHG | RESPIRATION RATE: 16 BRPM

## 2024-04-30 DIAGNOSIS — M25.562 CHRONIC PAIN OF LEFT KNEE: Primary | ICD-10-CM

## 2024-04-30 DIAGNOSIS — M25.362 INSTABILITY OF LEFT KNEE JOINT: ICD-10-CM

## 2024-04-30 DIAGNOSIS — G89.29 CHRONIC PAIN OF LEFT KNEE: Primary | ICD-10-CM

## 2024-04-30 DIAGNOSIS — M25.469 KNEE SWELLING: ICD-10-CM

## 2024-04-30 PROCEDURE — 99213 OFFICE O/P EST LOW 20 MIN: CPT | Performed by: NURSE PRACTITIONER

## 2024-04-30 NOTE — PROGRESS NOTES
Echo ordered. Patient reports understanding and has no further questions or concerns at this time.     RN transferred her to the  to arrange.   \"Have you been to the ER, urgent care clinic since your last visit?  Hospitalized since your last visit?\"    NO    “Have you seen or consulted any other health care providers outside of [facility] since your last visit?”    NO            Click Here for Release of Records Request

## 2024-04-30 NOTE — PROGRESS NOTES
[address] [place] [address]               [phone]      Karrie Hummel is a 43 y.o. female and presents with Knee Pain (Left; has had it for a while but is now getting worse) and Swelling (Left knee)       Assessment/Plan:    1. Chronic pain of left knee  -     XR KNEE LEFT (3 VIEWS); Future  -     [facility] - Roman, Bemjamin, DO, Orthopedic Surgery(General/Total Joint), [place] ([facility])  2. Instability of left knee joint  -     XR KNEE LEFT (3 VIEWS); Future  -     [facility] - Roman, Bemjamin, DO, Orthopedic Surgery(General/Total Joint), [place] ([facility])  3. Knee swelling  -     XR KNEE LEFT (3 VIEWS); Future  -     [facility] - Roman, Bemjamin, DO, Orthopedic Surgery(General/Total Joint), [place] ([facility])     C/o left knee pain, swelling and feeling of instability  Will obtain x-ray today and refer to ortho for further evaluation  Patient verbalized understanding and is in agreement with this plan of care      Follow up and disposition:   Return for keep previously scheduled follow up.      Subjective:    Labs obtained prior to visit? No  Reviewed with patient? N/A    Knee pain  Left knee  Onset: about two months ago and has worsened  Noticed some swelling about two weeks ago, located proximal to patella  States the knee feels tight and there is a pulling in the back of her leg  Has tried aleve and tylenol with no relief  Tried elevation and ice helps some  Is unable to exercise  When she is walking it will pop and feels a rubbing  Feels like her knee will give out, unable to pivot on the leg  Denies any significant trauma to that knee  Has tried to wear a brace: causes increased pain and tingling in her toes    ROS:     Review of Systems  As stated in HPI, otherwise all others negative.       The problem list was updated as a part of today's visit.  Patient Active Problem List   Diagnosis    ADHD (attention deficit hyperactivity disorder)    Dysmenorrhea

## 2024-05-03 NOTE — PROGRESS NOTES
Patient: Karrie Hummel                MRN: [id number]       SSN: [id number]  YOB: 1980        AGE: 43 y.o.        SEX: female      PCP: Sofia Keita APRN - CNP  05/09/24    Chief Complaint   Patient presents with    Knee Pain     left     HISTORY:  Karrie Hummel is a 43 y.o. female referred by PHUONG Keita for 2 month history of left knee pain. She denies any recent injury.  She feels pain with standing, walking and stair climbing.  She experiences startup pain after sitting. She believes exercise has aggravated her knee pain.     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 She lives in [place] with her , 3 sons, a border collie named Eula Lopez, and a Pit Bull named oNah. She weighs 221 lbs and is 5'4\". She has lost weight by exercising. She is type 2 diabetic.  She has a strong family history of knee arthritis.  Her mother recently had knee replacement surgery and her cousin had bilateral knee replacement in her 20s.   Hemoglobin A1C   Date Value Ref Range Status   07/25/2023 6.3 (H) 4.2 - 5.6 % Final     Wt Readings from Last 3 Encounters:   05/09/24 100.2 kg (221 lb)   04/30/24 101.2 kg (223 lb)   04/04/24 101.1 kg (222 lb 12.8 oz)      Body mass index is 37.93 kg/m².    Patient Active Problem List   Diagnosis    ADHD (attention deficit hyperactivity disorder)    Dysmenorrhea    Type 2 diabetes mellitus with complication, without long-term current use of insulin (HCC)    Type 2 diabetes with nephropathy (HCC)    PCOS (polycystic ovarian syndrome)    Endometriosis of pelvic peritoneum    Vitamin D deficiency       Social History     Tobacco Use    Smoking status: Former     Current packs/day: 1.00     Types: Cigarettes    Smokeless tobacco: Never   Substance Use Topics    Alcohol use: Yes    Drug use: No        Allergies   Allergen Reactions    Metformin And Related Nausea And Vomiting

## 2024-05-08 SDOH — HEALTH STABILITY: PHYSICAL HEALTH: ON AVERAGE, HOW MANY MINUTES DO YOU ENGAGE IN EXERCISE AT THIS LEVEL?: 50 MIN

## 2024-05-08 SDOH — HEALTH STABILITY: PHYSICAL HEALTH: ON AVERAGE, HOW MANY DAYS PER WEEK DO YOU ENGAGE IN MODERATE TO STRENUOUS EXERCISE (LIKE A BRISK WALK)?: 4 DAYS

## 2024-05-09 ENCOUNTER — OFFICE VISIT (OUTPATIENT)
Age: 44
End: 2024-05-09

## 2024-05-09 VITALS — HEIGHT: 64 IN | BODY MASS INDEX: 37.73 KG/M2 | WEIGHT: 221 LBS | TEMPERATURE: 97.7 F

## 2024-05-09 DIAGNOSIS — M17.12 UNILATERAL PRIMARY OSTEOARTHRITIS, LEFT KNEE: Primary | ICD-10-CM

## 2024-05-09 DIAGNOSIS — M25.562 CHRONIC PAIN OF LEFT KNEE: ICD-10-CM

## 2024-05-09 DIAGNOSIS — G89.29 CHRONIC PAIN OF LEFT KNEE: ICD-10-CM

## 2024-05-09 RX ORDER — BUPIVACAINE HYDROCHLORIDE 5 MG/ML
4 INJECTION, SOLUTION PERINEURAL ONCE
Status: COMPLETED | OUTPATIENT
Start: 2024-05-09 | End: 2024-05-09

## 2024-05-09 RX ORDER — BETAMETHASONE SODIUM PHOSPHATE AND BETAMETHASONE ACETATE 3; 3 MG/ML; MG/ML
3 INJECTION, SUSPENSION INTRA-ARTICULAR; INTRALESIONAL; INTRAMUSCULAR; SOFT TISSUE ONCE
Status: COMPLETED | OUTPATIENT
Start: 2024-05-09 | End: 2024-05-09

## 2024-05-09 RX ADMIN — BUPIVACAINE HYDROCHLORIDE 20 MG: 5 INJECTION, SOLUTION PERINEURAL at 09:59

## 2024-05-09 RX ADMIN — BETAMETHASONE SODIUM PHOSPHATE AND BETAMETHASONE ACETATE 3 MG: 3; 3 INJECTION, SUSPENSION INTRA-ARTICULAR; INTRALESIONAL; INTRAMUSCULAR; SOFT TISSUE at 09:58

## 2024-06-04 ENCOUNTER — HOSPITAL ENCOUNTER (OUTPATIENT)
Facility: HOSPITAL | Age: 44
Setting detail: SPECIMEN
Discharge: HOME | End: 2024-06-07
Payer: COMMERCIAL

## 2024-06-04 LAB
ALBUMIN SERPL-MCNC: 3.7 G/DL (ref 3.4–5)
ALBUMIN/GLOB SERPL: 1.1 (ref 0.8–1.7)
ALP SERPL-CCNC: 93 U/L (ref 45–117)
ALT SERPL-CCNC: 20 U/L (ref 13–56)
ANION GAP SERPL CALC-SCNC: 2 MMOL/L (ref 3–18)
AST SERPL-CCNC: 13 U/L (ref 10–38)
BILIRUB SERPL-MCNC: 0.4 MG/DL (ref 0.2–1)
BUN SERPL-MCNC: 9 MG/DL (ref 7–18)
BUN/CREAT SERPL: 14 (ref 12–20)
CALCIUM SERPL-MCNC: 9.1 MG/DL (ref 8.5–10.1)
CHLORIDE SERPL-SCNC: 109 MMOL/L (ref 100–111)
CHOLEST SERPL-MCNC: 139 MG/DL
CO2 SERPL-SCNC: 27 MMOL/L (ref 21–32)
CREAT SERPL-MCNC: 0.63 MG/DL (ref 0.6–1.3)
ERYTHROCYTE [DISTWIDTH] IN BLOOD BY AUTOMATED COUNT: 12.3 % (ref 11.6–14.5)
EST. AVERAGE GLUCOSE BLD GHB EST-MCNC: 143 MG/DL
GLOBULIN SER CALC-MCNC: 3.4 G/DL (ref 2–4)
GLUCOSE SERPL-MCNC: 120 MG/DL (ref 74–99)
HBA1C MFR BLD: 6.6 % (ref 4.2–5.6)
HCT VFR BLD AUTO: 37.2 % (ref 35–45)
HDLC SERPL-MCNC: 41 MG/DL (ref 40–60)
HDLC SERPL: 3.4 (ref 0–5)
HGB BLD-MCNC: 12 G/DL (ref 12–16)
LDLC SERPL CALC-MCNC: 81.8 MG/DL (ref 0–100)
LIPID PANEL: NORMAL
MCH RBC QN AUTO: 30.1 PG (ref 24–34)
MCHC RBC AUTO-ENTMCNC: 32.3 G/DL (ref 31–37)
MCV RBC AUTO: 93.2 FL (ref 78–100)
NRBC # BLD: 0 K/UL (ref 0–0.01)
NRBC BLD-RTO: 0 PER 100 WBC
PLATELET # BLD AUTO: 202 K/UL (ref 135–420)
PMV BLD AUTO: 13.2 FL (ref 9.2–11.8)
POTASSIUM SERPL-SCNC: 4.2 MMOL/L (ref 3.5–5.5)
PROT SERPL-MCNC: 7.1 G/DL (ref 6.4–8.2)
RBC # BLD AUTO: 3.99 M/UL (ref 4.2–5.3)
SODIUM SERPL-SCNC: 138 MMOL/L (ref 136–145)
TRIGL SERPL-MCNC: 81 MG/DL
VLDLC SERPL CALC-MCNC: 16.2 MG/DL
WBC # BLD AUTO: 8 K/UL (ref 4.6–13.2)

## 2024-06-04 PROCEDURE — 36415 COLL VENOUS BLD VENIPUNCTURE: CPT

## 2024-06-04 PROCEDURE — 80061 LIPID PANEL: CPT

## 2024-06-04 PROCEDURE — 80053 COMPREHEN METABOLIC PANEL: CPT

## 2024-06-04 PROCEDURE — 83036 HEMOGLOBIN GLYCOSYLATED A1C: CPT

## 2024-06-04 PROCEDURE — 85027 COMPLETE CBC AUTOMATED: CPT

## 2024-06-11 ENCOUNTER — OFFICE VISIT (OUTPATIENT)
Facility: CLINIC | Age: 44
End: 2024-06-11
Payer: COMMERCIAL

## 2024-06-11 ENCOUNTER — TELEPHONE (OUTPATIENT)
Facility: HOSPITAL | Age: 44
End: 2024-06-11

## 2024-06-11 VITALS
HEART RATE: 65 BPM | RESPIRATION RATE: 18 BRPM | SYSTOLIC BLOOD PRESSURE: 144 MMHG | WEIGHT: 223.4 LBS | TEMPERATURE: 98 F | DIASTOLIC BLOOD PRESSURE: 94 MMHG | HEIGHT: 64 IN | OXYGEN SATURATION: 96 % | BODY MASS INDEX: 38.14 KG/M2

## 2024-06-11 DIAGNOSIS — E78.2 MIXED HYPERLIPIDEMIA: ICD-10-CM

## 2024-06-11 DIAGNOSIS — E11.21 TYPE 2 DIABETES WITH NEPHROPATHY (HCC): ICD-10-CM

## 2024-06-11 DIAGNOSIS — I10 ESSENTIAL HYPERTENSION: Primary | ICD-10-CM

## 2024-06-11 DIAGNOSIS — E55.9 VITAMIN D DEFICIENCY: ICD-10-CM

## 2024-06-11 PROBLEM — E11.8 TYPE 2 DIABETES MELLITUS WITH COMPLICATION, WITHOUT LONG-TERM CURRENT USE OF INSULIN (HCC): Chronic | Status: ACTIVE | Noted: 2018-06-14

## 2024-06-11 PROCEDURE — 3077F SYST BP >= 140 MM HG: CPT | Performed by: NURSE PRACTITIONER

## 2024-06-11 PROCEDURE — 3080F DIAST BP >= 90 MM HG: CPT | Performed by: NURSE PRACTITIONER

## 2024-06-11 PROCEDURE — 3044F HG A1C LEVEL LT 7.0%: CPT | Performed by: NURSE PRACTITIONER

## 2024-06-11 PROCEDURE — 99214 OFFICE O/P EST MOD 30 MIN: CPT | Performed by: NURSE PRACTITIONER

## 2024-06-11 RX ORDER — SEMAGLUTIDE 0.68 MG/ML
0.25 INJECTION, SOLUTION SUBCUTANEOUS
Qty: 3 ML | Refills: 0 | Status: SHIPPED | OUTPATIENT
Start: 2024-06-11

## 2024-06-11 RX ORDER — BLOOD-GLUCOSE METER
1 EACH MISCELLANEOUS DAILY
Qty: 1 KIT | Refills: 0 | Status: SHIPPED | OUTPATIENT
Start: 2024-06-11

## 2024-06-11 ASSESSMENT — PATIENT HEALTH QUESTIONNAIRE - PHQ9
SUM OF ALL RESPONSES TO PHQ QUESTIONS 1-9: 1
10. IF YOU CHECKED OFF ANY PROBLEMS, HOW DIFFICULT HAVE THESE PROBLEMS MADE IT FOR YOU TO DO YOUR WORK, TAKE CARE OF THINGS AT HOME, OR GET ALONG WITH OTHER PEOPLE: NOT DIFFICULT AT ALL
SUM OF ALL RESPONSES TO PHQ QUESTIONS 1-9: 1
4. FEELING TIRED OR HAVING LITTLE ENERGY: NOT AT ALL
3. TROUBLE FALLING OR STAYING ASLEEP: NOT AT ALL
SUM OF ALL RESPONSES TO PHQ QUESTIONS 1-9: 1
2. FEELING DOWN, DEPRESSED OR HOPELESS: SEVERAL DAYS
5. POOR APPETITE OR OVEREATING: NOT AT ALL
8. MOVING OR SPEAKING SO SLOWLY THAT OTHER PEOPLE COULD HAVE NOTICED. OR THE OPPOSITE, BEING SO FIGETY OR RESTLESS THAT YOU HAVE BEEN MOVING AROUND A LOT MORE THAN USUAL: NOT AT ALL
9. THOUGHTS THAT YOU WOULD BE BETTER OFF DEAD, OR OF HURTING YOURSELF: NOT AT ALL
1. LITTLE INTEREST OR PLEASURE IN DOING THINGS: NOT AT ALL
7. TROUBLE CONCENTRATING ON THINGS, SUCH AS READING THE NEWSPAPER OR WATCHING TELEVISION: NOT AT ALL
SUM OF ALL RESPONSES TO PHQ9 QUESTIONS 1 & 2: 1
6. FEELING BAD ABOUT YOURSELF - OR THAT YOU ARE A FAILURE OR HAVE LET YOURSELF OR YOUR FAMILY DOWN: NOT AT ALL
SUM OF ALL RESPONSES TO PHQ QUESTIONS 1-9: 1

## 2024-06-11 NOTE — PROGRESS NOTES
Room 8     Karrie Hummel had concerns including Follow-up Chronic Condition, Hypertension, Diabetes, and Cholesterol Problem. for today's visit .     When asked if patient has any concerns she would like to address with VICKY Keita pt states I would like to discuss taking weight loss medicine due to knee injury  . VICKY Keita has been notified  of patient concerns .    Did patient bring someone? No     Did the patient have DME equipment? No     Did you take your medication today? Yes       1. \"Have you been to the ER, urgent care clinic since your last visit?  Hospitalized since your last visit?\" .NO    2. \"Have you seen or consulted any other health care providers outside of the [facility] since your last visit?\" Physical Therapist for left knee injury     3. For patients aged 45-75: Has the patient had a colonoscopy / FIT/ Cologuard? N/A      If the patient is female:    4. For patients aged 40-74: Has the patient had a mammogram within the past 2 years? N/A      5. For patients aged 21-65: Has the patient had a pap smear? {Cancer Care Gap present? Yes           3/15/2022    10:00 AM   Amb Fall Risk Assessment and TUG Test   Fall in past 12 months? 0   Able to walk? Yes              6/11/2024     8:53 AM   PHQ-9    Little interest or pleasure in doing things 0   Feeling down, depressed, or hopeless 1   Trouble falling or staying asleep, or sleeping too much 0   Feeling tired or having little energy 0   Poor appetite or overeating 0   Feeling bad about yourself - or that you are a failure or have let yourself or your family down 0   Trouble concentrating on things, such as reading the newspaper or watching television 0   Moving or speaking so slowly that other people could have noticed. Or the opposite - being so fidgety or restless that you have been moving around a lot more than usual 0   Thoughts that you would be better off dead, or of hurting yourself in some way 0   PHQ-2 Score 1   PHQ-9 
atorvastatin (LIPITOR) 20 MG tablet Take 1 tablet by mouth daily             ROS:     Review of Systems  As stated in HPI, otherwise all others negative.       The problem list was updated as a part of today's visit.  Patient Active Problem List   Diagnosis    ADHD (attention deficit hyperactivity disorder)    Dysmenorrhea    Type 2 diabetes mellitus with complication, without long-term current use of insulin (HCC)    Type 2 diabetes with nephropathy (HCC)    PCOS (polycystic ovarian syndrome)    Endometriosis of pelvic peritoneum    Vitamin D deficiency       The PSH, FH were reviewed.      SH:  Social History     Tobacco Use    Smoking status: Former     Current packs/day: 1.00     Types: Cigarettes    Smokeless tobacco: Never   Substance Use Topics    Alcohol use: Not Currently    Drug use: No       Medications/Allergies:  Current Outpatient Medications on File Prior to Visit   Medication Sig Dispense Refill    vitamin D (VITAMIN D3) 47451 UNIT CAPS Take 1 capsule by mouth every 7 days 12 capsule 1    atorvastatin (LIPITOR) 20 MG tablet Take 1 tablet by mouth daily 90 tablet 1    pioglitazone (ACTOS) 15 MG tablet Take 1 tablet by mouth daily 90 tablet 1    olmesartan (BENICAR) 20 MG tablet Take 1 tablet by mouth daily 90 tablet 1    Multiple Vitamins-Minerals (MULTIVITAMIN ADULTS PO) Take by mouth      Lancets MISC        No current facility-administered medications on file prior to visit.        Allergies   Allergen Reactions    Metformin And Related Nausea And Vomiting       Objective:  BP (!) 144/94 Comment: feet flat on floor  Pulse 65   Temp 98 °F (36.7 °C) (Temporal)   Resp 18   Ht 1.626 m (5' 4\")   Wt 101.3 kg (223 lb 6.4 oz)   SpO2 96%   BMI 38.35 kg/m²  Body mass index is 38.35 kg/m².    Physical assessment  Physical Exam  Vitals and nursing note reviewed.   Constitutional:       Appearance: Normal appearance.   HENT:      Head: Normocephalic and atraumatic.   Eyes:      Pupils: Pupils are equal,

## 2024-06-13 ENCOUNTER — HOSPITAL ENCOUNTER (OUTPATIENT)
Facility: HOSPITAL | Age: 44
Setting detail: RECURRING SERIES
Discharge: HOME | End: 2024-06-16
Payer: COMMERCIAL

## 2024-06-13 PROCEDURE — 97162 PT EVAL MOD COMPLEX 30 MIN: CPT

## 2024-06-13 NOTE — THERAPY EVALUATION
completed for valid certification **  Please sign and fax to [facility]. Thank you    MEDICAID  To ensure your patient receives the highest quality care and to avoid disruption in therapy please sign and return this plan of care within 21 days. Per Medicaid guidelines if the plan of care is not received within 21 days the patient's care must be put on hold until signed.

## 2024-06-13 NOTE — PROGRESS NOTES
PHYSICAL THERAPY - DAILY TREATMENT NOTE    Patient Name: Karrie Hummel    Date: 2024    : 1980  Insurance: Payor: [employer] [facility] / Plan: [facility] [employer] [facility] / Product Type: *No Product type* /      Patient  verified YES   Visit #   Current / Total 1 16   Time   In / Out 1:40 2:20   Pain   In / Out 4 4   Subjective Functional Status/Changes: SEE EVALUATION     TREATMENT AREA = Left knee pain [M25.562]    OBJECTIVE        Therapeutic Procedures:  Tx Min Billable or 1:1 Min (if diff from Tx Min) Procedure, Rationale, Specifics   7  [id number] Self Care/Home Management (timed):  improve patient knowledge and understanding of pain reducing techniques, positioning, activity modification, diagnosis/prognosis, and physical therapy expectations, procedures and progression  to improve patient's ability to progress to PLOF and address remaining functional goals.  (see flow sheet as applicable)     Details if applicable:       7 [address] [facility] Totals Reminder: bill using total billable min of TIMED therapeutic procedures (example: do not include dry needle or estim unattended, both untimed codes, in totals to left)  8-22 min = 1 unit; 23-37 min = 2 units; 38-52 min = 3 units; 53-67 min = 4 units; 68-82 min = 5 units   Total Total     [x]  Patient Education billed concurrently with other procedures   [x] Review HEP    [] Progressed/Changed HEP, detail:    [] Other detail:       Objective Information/Functional Measures/Assessment    SEE EVALUATION    Patient will benefit from skilled PT services to  modify and progress therapeutic interventions, analyze and address functional mobility deficits, analyze and address ROM deficits, analyze and address strength deficits, analyze and address soft tissue restrictions, analyze and cue for proper movement patterns, and analyze and modify for postural abnormalities to address functional deficits and attain remaining goals.      Progress toward goals /

## 2024-06-28 ENCOUNTER — APPOINTMENT (OUTPATIENT)
Facility: HOSPITAL | Age: 44
End: 2024-06-28
Payer: COMMERCIAL

## 2024-07-19 DIAGNOSIS — E11.21 TYPE 2 DIABETES WITH NEPHROPATHY (HCC): ICD-10-CM

## 2024-07-22 RX ORDER — BLOOD-GLUCOSE METER
EACH MISCELLANEOUS
Qty: 1 KIT | Refills: 0 | Status: SHIPPED | OUTPATIENT
Start: 2024-07-22

## 2024-07-25 ENCOUNTER — PATIENT MESSAGE (OUTPATIENT)
Facility: CLINIC | Age: 44
End: 2024-07-25

## 2024-07-25 DIAGNOSIS — E11.21 TYPE 2 DIABETES WITH NEPHROPATHY (HCC): Primary | ICD-10-CM

## 2024-08-05 RX ORDER — SEMAGLUTIDE 0.68 MG/ML
0.5 INJECTION, SOLUTION SUBCUTANEOUS
Qty: 3 ML | Refills: 1 | Status: SHIPPED | OUTPATIENT
Start: 2024-08-05

## 2024-08-05 NOTE — TELEPHONE ENCOUNTER
From: Karrie Hummel  To: Sofia Keita  Sent: 7/25/2024 10:36 AM EDT  Subject: Ozempic and new insurance     Good Morning Sofia,    I now have private insurance and updated it with [facility]. With a discount from the pharmacy, I got my first Ozempic pen. The pharmacy technician said that you would need to redo the process with my new insurance again for the others. I understand that there still is a chance it might go through. See you in September!    Thank you again!    Karrie Hummel

## 2024-08-12 DIAGNOSIS — E11.21 TYPE 2 DIABETES WITH NEPHROPATHY (HCC): ICD-10-CM

## 2024-08-12 DIAGNOSIS — I10 ESSENTIAL HYPERTENSION: ICD-10-CM

## 2024-08-12 RX ORDER — OLMESARTAN MEDOXOMIL 20 MG/1
20 TABLET ORAL DAILY
Qty: 90 TABLET | Refills: 2 | OUTPATIENT
Start: 2024-08-12

## 2024-08-12 RX ORDER — PIOGLITAZONEHYDROCHLORIDE 15 MG/1
15 TABLET ORAL DAILY
Qty: 90 TABLET | Refills: 2 | OUTPATIENT
Start: 2024-08-12

## 2024-09-11 LAB
A/G RATIO: 2 RATIO (ref 1.1–2.6)
ALBUMIN: 4.6 G/DL (ref 3.5–5)
ALP BLD-CCNC: 100 U/L (ref 25–115)
ALT SERPL-CCNC: 14 U/L (ref 5–40)
ANION GAP SERPL CALCULATED.3IONS-SCNC: 13 MMOL/L (ref 3–15)
AST SERPL-CCNC: 14 U/L (ref 10–37)
BILIRUB SERPL-MCNC: 0.5 MG/DL (ref 0.2–1.2)
BUN BLDV-MCNC: 7 MG/DL (ref 6–22)
CALCIUM SERPL-MCNC: 9.5 MG/DL (ref 8.4–10.5)
CHLORIDE BLD-SCNC: 102 MMOL/L (ref 98–110)
CHOLESTEROL, TOTAL: 165 MG/DL (ref 110–200)
CHOLESTEROL/HDL RATIO: 5 (ref 0–5)
CO2: 23 MMOL/L (ref 20–32)
CREAT SERPL-MCNC: 0.6 MG/DL (ref 0.5–1.2)
CREATININE URINE: 390 MG/DL
ESTIMATED AVERAGE GLUCOSE: 136 MG/DL (ref 91–123)
GFR, ESTIMATED: >60 ML/MIN/1.73 SQ.M.
GLOBULIN: 2.3 G/DL (ref 2–4)
GLUCOSE: 87 MG/DL (ref 70–99)
HBA1C MFR BLD: 6.4 % (ref 4.8–5.6)
HCT VFR BLD CALC: 38.2 % (ref 35.1–46.5)
HDLC SERPL-MCNC: 33 MG/DL
HEMOGLOBIN: 12.3 G/DL (ref 11.7–15.5)
LDL CHOLESTEROL: 108 MG/DL (ref 50–99)
LDL/HDL RATIO: 3.3
MCH RBC QN AUTO: 29 PG (ref 26–34)
MCHC RBC AUTO-ENTMCNC: 32 G/DL (ref 31–36)
MCV RBC AUTO: 91 FL (ref 80–99)
MICROALB/CREAT RATIO (UG/MG CREAT.): 6.3 (ref 0–30)
MICROALBUMIN/CREAT 24H UR: 24.6 MG/L (ref 0.1–17)
NON-HDL CHOLESTEROL: 132 MG/DL
PDW BLD-RTO: 12 % (ref 10–15.5)
PLATELET # BLD: 229 K/UL (ref 140–440)
PMV BLD AUTO: 12.8 FL (ref 9–13)
POTASSIUM SERPL-SCNC: 4.2 MMOL/L (ref 3.5–5.5)
RBC # BLD: 4.21 M/UL (ref 3.8–5.2)
SODIUM BLD-SCNC: 138 MMOL/L (ref 133–145)
TOTAL PROTEIN: 6.9 G/DL (ref 6.4–8.3)
TRIGL SERPL-MCNC: 120 MG/DL (ref 40–149)
VITAMIN D 25-HYDROXY: 75.2 NG/ML (ref 32–100)
VLDLC SERPL CALC-MCNC: 24 MG/DL (ref 8–30)
WBC # BLD: 8.7 K/UL (ref 4–11)

## 2024-09-18 ENCOUNTER — OFFICE VISIT (OUTPATIENT)
Facility: CLINIC | Age: 44
End: 2024-09-18
Payer: COMMERCIAL

## 2024-09-18 VITALS
SYSTOLIC BLOOD PRESSURE: 124 MMHG | DIASTOLIC BLOOD PRESSURE: 79 MMHG | OXYGEN SATURATION: 95 % | BODY MASS INDEX: 36.88 KG/M2 | HEART RATE: 71 BPM | WEIGHT: 216 LBS | TEMPERATURE: 98 F | HEIGHT: 64 IN | RESPIRATION RATE: 18 BRPM

## 2024-09-18 DIAGNOSIS — E78.2 MIXED HYPERLIPIDEMIA: ICD-10-CM

## 2024-09-18 DIAGNOSIS — E55.9 VITAMIN D DEFICIENCY: ICD-10-CM

## 2024-09-18 DIAGNOSIS — I10 ESSENTIAL HYPERTENSION: ICD-10-CM

## 2024-09-18 DIAGNOSIS — E11.21 TYPE 2 DIABETES WITH NEPHROPATHY (HCC): ICD-10-CM

## 2024-09-18 DIAGNOSIS — Z00.00 WELL WOMAN EXAM (NO GYNECOLOGICAL EXAM): Primary | ICD-10-CM

## 2024-09-18 PROCEDURE — 99396 PREV VISIT EST AGE 40-64: CPT | Performed by: NURSE PRACTITIONER

## 2024-09-18 PROCEDURE — 3074F SYST BP LT 130 MM HG: CPT | Performed by: NURSE PRACTITIONER

## 2024-09-18 PROCEDURE — 3078F DIAST BP <80 MM HG: CPT | Performed by: NURSE PRACTITIONER

## 2024-09-18 RX ORDER — OLMESARTAN MEDOXOMIL 20 MG/1
20 TABLET ORAL DAILY
Qty: 90 TABLET | Refills: 1 | Status: SHIPPED | OUTPATIENT
Start: 2024-09-18 | End: 2025-03-17

## 2024-09-18 RX ORDER — PIOGLITAZONEHYDROCHLORIDE 15 MG/1
15 TABLET ORAL DAILY
Qty: 90 TABLET | Refills: 1 | Status: SHIPPED | OUTPATIENT
Start: 2024-09-18

## 2024-09-18 RX ORDER — CHOLECALCIFEROL (VITAMIN D3) 1250 MCG
1 CAPSULE ORAL
Qty: 12 CAPSULE | Refills: 1 | Status: CANCELLED | OUTPATIENT
Start: 2024-09-18

## 2024-09-18 RX ORDER — ATORVASTATIN CALCIUM 20 MG/1
20 TABLET, FILM COATED ORAL DAILY
Qty: 90 TABLET | Refills: 1 | Status: SHIPPED | OUTPATIENT
Start: 2024-09-18

## 2024-09-18 RX ORDER — SEMAGLUTIDE 0.68 MG/ML
0.5 INJECTION, SOLUTION SUBCUTANEOUS
Qty: 9 ML | Refills: 1 | Status: SHIPPED | OUTPATIENT
Start: 2024-09-18

## 2024-09-18 SDOH — ECONOMIC STABILITY: FOOD INSECURITY: WITHIN THE PAST 12 MONTHS, YOU WORRIED THAT YOUR FOOD WOULD RUN OUT BEFORE YOU GOT MONEY TO BUY MORE.: NEVER TRUE

## 2024-09-18 SDOH — ECONOMIC STABILITY: INCOME INSECURITY: HOW HARD IS IT FOR YOU TO PAY FOR THE VERY BASICS LIKE FOOD, HOUSING, MEDICAL CARE, AND HEATING?: NOT HARD AT ALL

## 2024-09-18 SDOH — ECONOMIC STABILITY: FOOD INSECURITY: WITHIN THE PAST 12 MONTHS, THE FOOD YOU BOUGHT JUST DIDN'T LAST AND YOU DIDN'T HAVE MONEY TO GET MORE.: NEVER TRUE

## 2024-09-18 ASSESSMENT — PATIENT HEALTH QUESTIONNAIRE - PHQ9
1. LITTLE INTEREST OR PLEASURE IN DOING THINGS: NOT AT ALL
5. POOR APPETITE OR OVEREATING: NOT AT ALL
7. TROUBLE CONCENTRATING ON THINGS, SUCH AS READING THE NEWSPAPER OR WATCHING TELEVISION: NOT AT ALL
9. THOUGHTS THAT YOU WOULD BE BETTER OFF DEAD, OR OF HURTING YOURSELF: NOT AT ALL
SUM OF ALL RESPONSES TO PHQ QUESTIONS 1-9: 0
SUM OF ALL RESPONSES TO PHQ9 QUESTIONS 1 & 2: 0
2. FEELING DOWN, DEPRESSED OR HOPELESS: NOT AT ALL
SUM OF ALL RESPONSES TO PHQ QUESTIONS 1-9: 0
6. FEELING BAD ABOUT YOURSELF - OR THAT YOU ARE A FAILURE OR HAVE LET YOURSELF OR YOUR FAMILY DOWN: NOT AT ALL
4. FEELING TIRED OR HAVING LITTLE ENERGY: NOT AT ALL
3. TROUBLE FALLING OR STAYING ASLEEP: NOT AT ALL
10. IF YOU CHECKED OFF ANY PROBLEMS, HOW DIFFICULT HAVE THESE PROBLEMS MADE IT FOR YOU TO DO YOUR WORK, TAKE CARE OF THINGS AT HOME, OR GET ALONG WITH OTHER PEOPLE: NOT DIFFICULT AT ALL
SUM OF ALL RESPONSES TO PHQ QUESTIONS 1-9: 0
SUM OF ALL RESPONSES TO PHQ QUESTIONS 1-9: 0
8. MOVING OR SPEAKING SO SLOWLY THAT OTHER PEOPLE COULD HAVE NOTICED. OR THE OPPOSITE, BEING SO FIGETY OR RESTLESS THAT YOU HAVE BEEN MOVING AROUND A LOT MORE THAN USUAL: NOT AT ALL

## 2024-09-18 NOTE — PROGRESS NOTES
[address] [place] [address]               [phone]      Karrie Hummel is a 43 y.o. female and presents with Annual Exam, Follow-up Chronic Condition, Hypertension, Cholesterol Problem, and Diabetes       Assessment/Plan:    1. Well woman exam (no gynecological exam)  2. Essential hypertension  -     olmesartan (BENICAR) 20 MG tablet; Take 1 tablet by mouth daily, Disp-90 tablet, R-1Normal  -     Comprehensive Metabolic Panel; Future  3. Mixed hyperlipidemia  -     atorvastatin (LIPITOR) 20 MG tablet; Take 1 tablet by mouth daily, Disp-90 tablet, R-1Normal  -     Lipid Panel; Future  4. Type 2 diabetes with nephropathy (HCC)  -     pioglitazone (ACTOS) 15 MG tablet; Take 1 tablet by mouth daily, Disp-90 tablet, R-1Normal  -     Semaglutide,0.25 or 0.5MG/DOS, (OZEMPIC, 0.25 OR 0.5 MG/DOSE,) 2 MG/3ML SOPN; Inject 0.5 mg into the skin every 7 days, Disp-9 mL, R-1Normal  -     Hemoglobin A1C; Future  -     Microalbumin / Creatinine Urine Ratio; Future  5. Vitamin D deficiency  -     Vitamin D 25 Hydroxy; Future  Annual exam completed today  Blood pressure controlled, continue olmesartan at the same dose  Lipid borderline controlled, re-educated on need to avoid saturated fats, continue atorvastatin at same dose for now  DM controlled, A1C 6.4  Labs prior to next visit  Refills provided  Patient verbalized understanding and is in agreement with this plan of care        Follow up and disposition:   Return in about 4 months (around 1/18/2025) for DM, DMfoot, HTN, HLD, 30min, office, w/labsprior.      Subjective:    Labs obtained prior to visit? Yes  Reviewed with patient? yes    DMII-   Patient reports medication compliance Yes  Diabetic diet compliance daily  Patient monitors blood sugars regularly daily   Home glucose readings: am: 85  post prandial 105   Denies hypoglycemic episodes Yes  Denies polyuria, polydipsia, paraesthesia, vision changes? Yes  Engaging in daily

## 2024-09-18 NOTE — PROGRESS NOTES
Room 8   Karrie Hummel had concerns including Annual Exam, Follow-up Chronic Condition, Hypertension, Cholesterol Problem, and Diabetes. for today's visit .     1. \"Have you been to the ER, urgent care clinic since your last visit?  Hospitalized since your last visit?\" .NO    2. \"Have you seen or consulted any other health care providers outside of the [facility] since your last visit?\" No     3. For patients aged 45-75: Has the patient had a colonoscopy / FIT/ Cologuard? N/A      If the patient is female:    4. For patients aged 40-74: Has the patient had a mammogram within the past 2 years? Yes       5. For patients aged 21-65: Has the patient had a pap smear? {Cancer Care Gap present? Yes         3/15/2022    10:00 AM   Amb Fall Risk Assessment and TUG Test   Fall in past 12 months? 0   Able to walk? Yes              9/18/2024     8:29 AM   PHQ-9    Little interest or pleasure in doing things 0   Feeling down, depressed, or hopeless 0   Trouble falling or staying asleep, or sleeping too much 0   Feeling tired or having little energy 0   Poor appetite or overeating 0   Feeling bad about yourself - or that you are a failure or have let yourself or your family down 0   Trouble concentrating on things, such as reading the newspaper or watching television 0   Moving or speaking so slowly that other people could have noticed. Or the opposite - being so fidgety or restless that you have been moving around a lot more than usual 0   Thoughts that you would be better off dead, or of hurting yourself in some way 0   PHQ-2 Score 0   PHQ-9 Total Score 0   If you checked off any problems, how difficult have these problems made it for you to do your work, take care of things at home, or get along with other people? 0          Health Maintenance Due   Topic Date Due    Hepatitis B vaccine (1 of 3 - 19+ 3-dose series) Never done    DTaP/Tdap/Td vaccine (2 - Td or Tdap) 04/09/2019    Diabetic foot exam

## 2024-10-11 ENCOUNTER — TELEPHONE (OUTPATIENT)
Facility: CLINIC | Age: 44
End: 2024-10-11

## 2024-10-11 NOTE — TELEPHONE ENCOUNTER
Called [facility] for further information in regards to patient prior authorization  could not get in contact with a representative . Could not leave message due to telephone prompt was repeating itself . Number that was used [employer] is [phone].

## 2024-10-29 DIAGNOSIS — E55.9 VITAMIN D DEFICIENCY: ICD-10-CM

## 2024-10-30 RX ORDER — CHOLECALCIFEROL (VITAMIN D3) 1250 MCG
1 CAPSULE ORAL WEEKLY
Qty: 12 CAPSULE | Refills: 1 | OUTPATIENT
Start: 2024-10-30